# Patient Record
Sex: FEMALE | Race: WHITE | NOT HISPANIC OR LATINO | RURAL
[De-identification: names, ages, dates, MRNs, and addresses within clinical notes are randomized per-mention and may not be internally consistent; named-entity substitution may affect disease eponyms.]

---

## 2022-06-24 ENCOUNTER — CLINICAL SUPPORT (OUTPATIENT)
Dept: FAMILY MEDICINE CLINIC | Facility: CLINIC | Age: 37
End: 2022-06-24

## 2022-06-24 ENCOUNTER — TELEPHONE (OUTPATIENT)
Dept: FAMILY MEDICINE CLINIC | Facility: CLINIC | Age: 37
End: 2022-06-24

## 2022-06-24 DIAGNOSIS — I10 ESSENTIAL HYPERTENSION: Primary | ICD-10-CM

## 2022-06-24 NOTE — TELEPHONE ENCOUNTER
She came in for BP check, it was elevated at 152/98 heart rate 114, she claimed she had been to a camp all week and her bp had been running high, not on bp meds nor has she ever been on bp meds, I talked to ruba since no one has any openings.  She suggested that she come in on [facility] in clinic tomorrow or schedule an appt next week.  I explained this to the patient and apologized that with it being near closing on a Friday this was all I could offer her.  If at any point she had symptoms of chest pain, shortness or breath, blurred vision, headaches, to please to go ER.  I walked her to check out and James was working on getting her scheduled for something.

## 2022-06-27 ENCOUNTER — OFFICE VISIT (OUTPATIENT)
Dept: FAMILY MEDICINE CLINIC | Facility: CLINIC | Age: 37
End: 2022-06-27

## 2022-06-27 VITALS
HEIGHT: 65 IN | WEIGHT: 228 LBS | SYSTOLIC BLOOD PRESSURE: 140 MMHG | DIASTOLIC BLOOD PRESSURE: 86 MMHG | BODY MASS INDEX: 37.99 KG/M2

## 2022-06-27 DIAGNOSIS — J01.90 ACUTE NON-RECURRENT SINUSITIS, UNSPECIFIED LOCATION: ICD-10-CM

## 2022-06-27 DIAGNOSIS — I10 ESSENTIAL HYPERTENSION: Primary | ICD-10-CM

## 2022-06-27 PROCEDURE — 99213 OFFICE O/P EST LOW 20 MIN: CPT | Performed by: STUDENT IN AN ORGANIZED HEALTH CARE EDUCATION/TRAINING PROGRAM

## 2022-06-27 RX ORDER — LEVONORGESTREL 52 MG/1
INTRAUTERINE DEVICE INTRAUTERINE
COMMUNITY

## 2022-06-27 RX ORDER — LOSARTAN POTASSIUM 25 MG/1
25 TABLET ORAL DAILY
Qty: 30 TABLET | Refills: 1 | Status: SHIPPED | OUTPATIENT
Start: 2022-06-27 | End: 2022-07-18 | Stop reason: SDUPTHER

## 2022-06-27 RX ORDER — AMOXICILLIN 875 MG/1
875 TABLET, COATED ORAL 2 TIMES DAILY
Qty: 14 TABLET | Refills: 0 | Status: SHIPPED | OUTPATIENT
Start: 2022-06-27 | End: 2022-07-04

## 2022-06-27 NOTE — ASSESSMENT & PLAN NOTE
We will start low-dose losartan.  Patient does have Mirena, and discussed potential issues with pregnancy.  Patient does not intend on having any pregnancies in this time.    Follow-up in 2 weeks for blood pressure check and blood work.

## 2022-06-27 NOTE — ASSESSMENT & PLAN NOTE
We will treat with amoxicillin.Tylenol/Motrin for pain/fever. OTC cough and cold medication for symptoms. Nasal saline spray recommended. Cool mist humidifier at the bedside. RTC with new or worsening symptoms.

## 2022-06-27 NOTE — PROGRESS NOTES
"Chief Complaint  Blood Pressure Check    Subjective          Maryanne Reid presents to [facility]  History of Present Illness    Patient is here for evaluation of elevated blood pressure.     She was here on Friday and her blood pressure was 155/111 at Ortho. She states that she has had elevated readings when she has been having for over the past year. She has headaches when her blood pressure has been high. No Chest pain, SOA ect     She has no history of high blood pressures.     She thinks that the cuff that showed it was in the 180s/100s.     She has also had increased sinus drainage, congestion, and fullness in her ears over the past 7 days.  She states that she has been outdoors a lot and has not been able to control her symptoms with over-the-counter medications.  Denies fevers or known exposure to sick contacts.    Objective   Vital Signs:   /86 (BP Location: Left arm, Patient Position: Sitting, Cuff Size: Large Adult)   Ht 165.1 cm (65\")   Wt 103 kg (228 lb)   BMI 37.94 kg/m²     Body mass index is 37.94 kg/m².    Review of Systems    Past History:  Medical History: has no past medical history on file.   Surgical History: has a past surgical history that includes orthopedic surgery; Left oophorectomy; and Cholecystectomy.   Family History: family history is not on file.   Social History: reports that she has never smoked. She has never used smokeless tobacco. She reports previous alcohol use. She reports that she does not use drugs.      Current Outpatient Medications:   •  amoxicillin (AMOXIL) 875 MG tablet, Take 1 tablet by mouth 2 (Two) Times a Day for 7 days., Disp: 14 tablet, Rfl: 0  •  Levonorgestrel (Mirena, 52 MG,) 20 MCG/DAY intrauterine device IUD, , Disp: , Rfl:   •  losartan (Cozaar) 25 MG tablet, Take 1 tablet by mouth Daily., Disp: 30 tablet, Rfl: 1    Allergies: Patient has no known allergies.    Physical Exam  Constitutional:       General: She is not " in acute distress.     Appearance: Normal appearance. She is not ill-appearing or toxic-appearing.   HENT:      Head: Normocephalic and atraumatic.      Ears:      Comments: Clear effusion.      Nose:      Comments: Congestion, Erythema in POP  Cardiovascular:      Rate and Rhythm: Normal rate and regular rhythm.      Heart sounds: No murmur heard.    No gallop.   Pulmonary:      Effort: Pulmonary effort is normal.      Breath sounds: Normal breath sounds.   Abdominal:      General: Abdomen is flat.      Palpations: Abdomen is soft.      Tenderness: There is no abdominal tenderness. There is no guarding.   Musculoskeletal:      Right lower leg: No edema.      Left lower leg: No edema.   Neurological:      General: No focal deficit present.      Mental Status: She is alert and oriented to person, place, and time.   Psychiatric:         Mood and Affect: Mood normal.         Thought Content: Thought content normal.          Result Review :                   Assessment and Plan    Diagnoses and all orders for this visit:    1. Essential hypertension (Primary)  Assessment & Plan:  We will start low-dose losartan.  Patient does have Mirena, and discussed potential issues with pregnancy.  Patient does not intend on having any pregnancies in this time.    Follow-up in 2 weeks for blood pressure check and blood work.      2. Acute non-recurrent sinusitis, unspecified location  Assessment & Plan:  We will treat with amoxicillin.Tylenol/Motrin for pain/fever. OTC cough and cold medication for symptoms. Nasal saline spray recommended. Cool mist humidifier at the bedside. RTC with new or worsening symptoms.        Other orders  -     losartan (Cozaar) 25 MG tablet; Take 1 tablet by mouth Daily.  Dispense: 30 tablet; Refill: 1  -     amoxicillin (AMOXIL) 875 MG tablet; Take 1 tablet by mouth 2 (Two) Times a Day for 7 days.  Dispense: 14 tablet; Refill: 0      Follow Up   Return in about 2 weeks (around 7/11/2022).  Patient was  given instructions and counseling regarding her condition or for health maintenance advice. Please see specific information pulled into the AVS if appropriate.     Celsa Painter, DO

## 2022-07-11 DIAGNOSIS — Z13.220 LIPID SCREENING: ICD-10-CM

## 2022-07-11 DIAGNOSIS — I10 ESSENTIAL HYPERTENSION: Primary | ICD-10-CM

## 2022-07-12 ENCOUNTER — LAB (OUTPATIENT)
Dept: FAMILY MEDICINE CLINIC | Facility: CLINIC | Age: 37
End: 2022-07-12

## 2022-07-12 DIAGNOSIS — Z13.220 LIPID SCREENING: ICD-10-CM

## 2022-07-12 DIAGNOSIS — I10 ESSENTIAL HYPERTENSION: ICD-10-CM

## 2022-07-12 PROCEDURE — 36415 COLL VENOUS BLD VENIPUNCTURE: CPT | Performed by: STUDENT IN AN ORGANIZED HEALTH CARE EDUCATION/TRAINING PROGRAM

## 2022-07-13 LAB
ALBUMIN SERPL-MCNC: 4.5 G/DL (ref 3.8–4.8)
ALBUMIN/GLOB SERPL: 1.4 {RATIO} (ref 1.2–2.2)
ALP SERPL-CCNC: 64 IU/L (ref 44–121)
ALT SERPL-CCNC: 16 IU/L (ref 0–32)
AST SERPL-CCNC: 16 IU/L (ref 0–40)
BASOPHILS # BLD AUTO: 0 X10E3/UL (ref 0–0.2)
BASOPHILS NFR BLD AUTO: 1 %
BILIRUB SERPL-MCNC: 0.2 MG/DL (ref 0–1.2)
BUN SERPL-MCNC: 8 MG/DL (ref 6–20)
BUN/CREAT SERPL: 10 (ref 9–23)
CALCIUM SERPL-MCNC: 9.6 MG/DL (ref 8.7–10.2)
CHLORIDE SERPL-SCNC: 102 MMOL/L (ref 96–106)
CHOLEST SERPL-MCNC: 160 MG/DL (ref 100–199)
CO2 SERPL-SCNC: 24 MMOL/L (ref 20–29)
CREAT SERPL-MCNC: 0.81 MG/DL (ref 0.57–1)
EGFRCR SERPLBLD CKD-EPI 2021: 97 ML/MIN/1.73
EOSINOPHIL # BLD AUTO: 0.2 X10E3/UL (ref 0–0.4)
EOSINOPHIL NFR BLD AUTO: 3 %
ERYTHROCYTE [DISTWIDTH] IN BLOOD BY AUTOMATED COUNT: 13 % (ref 11.7–15.4)
GLOBULIN SER CALC-MCNC: 3.2 G/DL (ref 1.5–4.5)
GLUCOSE SERPL-MCNC: 90 MG/DL (ref 65–99)
HCT VFR BLD AUTO: 42.8 % (ref 34–46.6)
HDLC SERPL-MCNC: 38 MG/DL
HGB BLD-MCNC: 14.1 G/DL (ref 11.1–15.9)
IMM GRANULOCYTES # BLD AUTO: 0 X10E3/UL (ref 0–0.1)
IMM GRANULOCYTES NFR BLD AUTO: 0 %
LDLC SERPL CALC-MCNC: 89 MG/DL (ref 0–99)
LYMPHOCYTES # BLD AUTO: 2.1 X10E3/UL (ref 0.7–3.1)
LYMPHOCYTES NFR BLD AUTO: 35 %
MCH RBC QN AUTO: 28 PG (ref 26.6–33)
MCHC RBC AUTO-ENTMCNC: 32.9 G/DL (ref 31.5–35.7)
MCV RBC AUTO: 85 FL (ref 79–97)
MONOCYTES # BLD AUTO: 0.4 X10E3/UL (ref 0.1–0.9)
MONOCYTES NFR BLD AUTO: 7 %
NEUTROPHILS # BLD AUTO: 3.3 X10E3/UL (ref 1.4–7)
NEUTROPHILS NFR BLD AUTO: 54 %
PLATELET # BLD AUTO: 376 X10E3/UL (ref 150–450)
POTASSIUM SERPL-SCNC: 4.8 MMOL/L (ref 3.5–5.2)
PROT SERPL-MCNC: 7.7 G/DL (ref 6–8.5)
RBC # BLD AUTO: 5.04 X10E6/UL (ref 3.77–5.28)
SODIUM SERPL-SCNC: 139 MMOL/L (ref 134–144)
TRIGL SERPL-MCNC: 195 MG/DL (ref 0–149)
VLDLC SERPL CALC-MCNC: 33 MG/DL (ref 5–40)
WBC # BLD AUTO: 6 X10E3/UL (ref 3.4–10.8)

## 2022-07-18 ENCOUNTER — OFFICE VISIT (OUTPATIENT)
Dept: FAMILY MEDICINE CLINIC | Facility: CLINIC | Age: 37
End: 2022-07-18

## 2022-07-18 VITALS
DIASTOLIC BLOOD PRESSURE: 80 MMHG | HEIGHT: 65 IN | BODY MASS INDEX: 37.65 KG/M2 | SYSTOLIC BLOOD PRESSURE: 130 MMHG | WEIGHT: 226 LBS

## 2022-07-18 DIAGNOSIS — I10 ESSENTIAL HYPERTENSION: Primary | ICD-10-CM

## 2022-07-18 PROCEDURE — 99213 OFFICE O/P EST LOW 20 MIN: CPT | Performed by: STUDENT IN AN ORGANIZED HEALTH CARE EDUCATION/TRAINING PROGRAM

## 2022-07-18 RX ORDER — LOSARTAN POTASSIUM 50 MG/1
50 TABLET ORAL DAILY
Qty: 30 TABLET | Refills: 1 | Status: SHIPPED | OUTPATIENT
Start: 2022-07-18 | End: 2022-09-19 | Stop reason: SDUPTHER

## 2022-07-18 NOTE — ASSESSMENT & PLAN NOTE
Improved, but not at goal.  Will increase medication from 25 to 50 mg and see back in 3 months.  Continue to monitor blood pressure as an outpatient and call with any new or worsening symptoms.

## 2022-07-18 NOTE — PROGRESS NOTES
"Chief Complaint  Hypertension (Just started blood pressure meds)    Subjective          Maryanne Reid presents to [facility]  History of Present Illness    She is tolerating her medications well at this time. She has been checking her blood pressure and it is running in the 130s/90s.  He denies any headaches, chest pain, shortness of breath.  She is tolerating her medication without any side effects.  She is here for follow-up and discussion of previous blood work.        Objective   Vital Signs:   /80 (BP Location: Left arm, Patient Position: Sitting, Cuff Size: Large Adult)   Ht 165.1 cm (65\")   Wt 103 kg (226 lb)   BMI 37.61 kg/m²     Body mass index is 37.61 kg/m².    Review of Systems    Past History:  Medical History: has no past medical history on file.   Surgical History: has a past surgical history that includes orthopedic surgery; Left oophorectomy; and Cholecystectomy.   Family History: family history is not on file.   Social History: reports that she has never smoked. She has never used smokeless tobacco. She reports previous alcohol use. She reports that she does not use drugs.      Current Outpatient Medications:   •  losartan (Cozaar) 50 MG tablet, Take 1 tablet by mouth Daily., Disp: 30 tablet, Rfl: 1  •  Levonorgestrel (Mirena, 52 MG,) 20 MCG/DAY intrauterine device IUD, , Disp: , Rfl:     Allergies: Patient has no known allergies.    Physical Exam  Constitutional:       General: She is not in acute distress.     Appearance: She is not ill-appearing or toxic-appearing.   HENT:      Head: Normocephalic and atraumatic.   Cardiovascular:      Rate and Rhythm: Normal rate and regular rhythm.      Heart sounds: No murmur heard.  Pulmonary:      Effort: Pulmonary effort is normal. No respiratory distress.   Abdominal:      General: There is no distension.      Palpations: Abdomen is soft.      Tenderness: There is no abdominal tenderness.   Musculoskeletal:      " Right lower leg: No edema.      Left lower leg: No edema.   Neurological:      General: No focal deficit present.      Mental Status: She is alert and oriented to person, place, and time.   Psychiatric:         Mood and Affect: Mood normal.         Thought Content: Thought content normal.          Result Review :                   Assessment and Plan    Diagnoses and all orders for this visit:    1. Essential hypertension (Primary)  Assessment & Plan:  Improved, but not at goal.  Will increase medication from 25 to 50 mg and see back in 3 months.  Continue to monitor blood pressure as an outpatient and call with any new or worsening symptoms.      Other orders  -     losartan (Cozaar) 50 MG tablet; Take 1 tablet by mouth Daily.  Dispense: 30 tablet; Refill: 1      Follow Up   Return in about 3 months (around 10/18/2022) for Recheck.  Patient was given instructions and counseling regarding her condition or for health maintenance advice. Please see specific information pulled into the AVS if appropriate.     Celsa Painter, DO

## 2022-07-22 RX ORDER — LOSARTAN POTASSIUM 25 MG/1
25 TABLET ORAL DAILY
Qty: 90 TABLET | Refills: 0 | Status: SHIPPED | OUTPATIENT
Start: 2022-07-22 | End: 2022-09-21 | Stop reason: DRUGHIGH

## 2022-08-25 RX ORDER — LOSARTAN POTASSIUM 50 MG/1
50 TABLET ORAL DAILY
Qty: 30 TABLET | Refills: 1 | OUTPATIENT
Start: 2022-08-25

## 2022-09-19 NOTE — TELEPHONE ENCOUNTER
Caller: Maryanne Reid    Relationship: Self    Best call back number: [phone]    Requested Prescriptions:   Requested Prescriptions     Pending Prescriptions Disp Refills   • losartan (Cozaar) 50 MG tablet 30 tablet 1     Sig: Take 1 tablet by mouth Daily.        Pharm[facility] where request should be sent: [id number] [address] AT [facility] & [address] [facility] [phone] [facility] [phone] FX     Additional details provided by patient: PATIENT DOES NOT HAVE ANYMORE REFILLS, APPOINTMENT IS SET UP FOR 10.17. PATIENT NEEDS TEMPORARY REFILL UNTIL THEN.    Does the patient have less than a 3 day supply:  [x] Yes  [] No    Rona Valiente Rep   09/19/22 15:33 EDT

## 2022-09-21 RX ORDER — LOSARTAN POTASSIUM 50 MG/1
50 TABLET ORAL DAILY
Qty: 30 TABLET | Refills: 0 | Status: SHIPPED | OUTPATIENT
Start: 2022-09-21 | End: 2022-10-17 | Stop reason: SDUPTHER

## 2022-09-21 NOTE — TELEPHONE ENCOUNTER
Will send x 1 month until her appt since Dr Painter is out. Please check with patient to confirm which dosage she is taking- as both are listed in her chart. Thanks.

## 2022-09-22 RX ORDER — LOSARTAN POTASSIUM 50 MG/1
50 TABLET ORAL DAILY
Qty: 90 TABLET | OUTPATIENT
Start: 2022-09-22

## 2022-10-17 ENCOUNTER — OFFICE VISIT (OUTPATIENT)
Dept: FAMILY MEDICINE CLINIC | Facility: CLINIC | Age: 37
End: 2022-10-17

## 2022-10-17 VITALS
HEIGHT: 65 IN | BODY MASS INDEX: 37.32 KG/M2 | DIASTOLIC BLOOD PRESSURE: 78 MMHG | SYSTOLIC BLOOD PRESSURE: 128 MMHG | WEIGHT: 224 LBS

## 2022-10-17 DIAGNOSIS — I10 ESSENTIAL HYPERTENSION: Primary | ICD-10-CM

## 2022-10-17 DIAGNOSIS — G43.809 OTHER MIGRAINE WITHOUT STATUS MIGRAINOSUS, NOT INTRACTABLE: ICD-10-CM

## 2022-10-17 PROCEDURE — 99213 OFFICE O/P EST LOW 20 MIN: CPT | Performed by: STUDENT IN AN ORGANIZED HEALTH CARE EDUCATION/TRAINING PROGRAM

## 2022-10-17 RX ORDER — RIZATRIPTAN BENZOATE 10 MG/1
10 TABLET ORAL ONCE AS NEEDED
Qty: 20 TABLET | Refills: 0 | Status: SHIPPED | OUTPATIENT
Start: 2022-10-17 | End: 2022-12-27

## 2022-10-17 RX ORDER — PROMETHAZINE HYDROCHLORIDE 25 MG/1
25 TABLET ORAL EVERY 6 HOURS PRN
Qty: 15 TABLET | Refills: 0 | Status: SHIPPED | OUTPATIENT
Start: 2022-10-17

## 2022-10-17 RX ORDER — LOSARTAN POTASSIUM 50 MG/1
50 TABLET ORAL DAILY
Qty: 90 TABLET | Refills: 1 | Status: SHIPPED | OUTPATIENT
Start: 2022-10-17

## 2022-10-17 NOTE — PROGRESS NOTES
"Chief Complaint  Hypertension (Follow up)    Subjective          Maryanne Reid presents to [facility]  History of Present Illness    HTN: Here for follow up. She states that she is doing well with her medication. She denies any CP, SOA, HA. She does need refills at this time.     Migraines: She states that they have been going on for some time, but in the past few months she states that they have gotten worse. She states that she has been using promethazine in the past to help with the nausea for this. She has used sumatriptan in the past with significant side effects.         Objective   Vital Signs:   /78 (BP Location: Left arm, Patient Position: Sitting, Cuff Size: Large Adult)   Ht 165.1 cm (65\")   Wt 102 kg (224 lb)   BMI 37.28 kg/m²     Body mass index is 37.28 kg/m².    Review of Systems    Past History:  Medical History: has no past medical history on file.   Surgical History: has a past surgical history that includes orthopedic surgery; Left oophorectomy; and Cholecystectomy.   Family History: family history is not on file.   Social History: reports that she has never smoked. She has never used smokeless tobacco. She reports that she does not currently use alcohol. She reports that she does not use drugs.      Current Outpatient Medications:   •  losartan (Cozaar) 50 MG tablet, Take 1 tablet by mouth Daily., Disp: 90 tablet, Rfl: 1  •  Levonorgestrel (Mirena, 52 MG,) 20 MCG/DAY intrauterine device IUD, , Disp: , Rfl:   •  promethazine (PHENERGAN) 25 MG tablet, Take 1 tablet by mouth Every 6 (Six) Hours As Needed for Nausea or Vomiting., Disp: 15 tablet, Rfl: 0  •  rizatriptan (Maxalt) 10 MG tablet, Take 1 tablet by mouth 1 (One) Time As Needed for Migraine for up to 1 dose. May repeat in 2 hours if needed, Disp: 20 tablet, Rfl: 0    Allergies: Patient has no known allergies.    Physical Exam  Constitutional:       General: She is not in acute distress.     Appearance: " She is not ill-appearing or toxic-appearing.   HENT:      Head: Normocephalic and atraumatic.   Cardiovascular:      Rate and Rhythm: Normal rate and regular rhythm.      Heart sounds: No murmur heard.  Pulmonary:      Effort: Pulmonary effort is normal. No respiratory distress.   Neurological:      General: No focal deficit present.      Mental Status: She is alert and oriented to person, place, and time.   Psychiatric:         Mood and Affect: Mood normal.         Thought Content: Thought content normal.          Result Review :                   Assessment and Plan    Diagnoses and all orders for this visit:    1. Essential hypertension (Primary)    2. Other migraine without status migrainosus, not intractable    Other orders  -     losartan (Cozaar) 50 MG tablet; Take 1 tablet by mouth Daily.  Dispense: 90 tablet; Refill: 1  -     rizatriptan (Maxalt) 10 MG tablet; Take 1 tablet by mouth 1 (One) Time As Needed for Migraine for up to 1 dose. May repeat in 2 hours if needed  Dispense: 20 tablet; Refill: 0  -     promethazine (PHENERGAN) 25 MG tablet; Take 1 tablet by mouth Every 6 (Six) Hours As Needed for Nausea or Vomiting.  Dispense: 15 tablet; Refill: 0    Will refill losartan and patient advised that she should continue the meidcaiton. Call with new or worsening symptoms.     Will also send in maxalt as she has not tried this. She has not done well with sumitriptan and has not tolerated this. If she does not do well with this we will try Nurtec. Follow up in 6 months or sooner with new or worsening symptoms.       Follow Up   No follow-ups on file.  Patient was given instructions and counseling regarding her condition or for health maintenance advice. Please see specific information pulled into the AVS if appropriate.     Celsa Painter, DO   alcohol intoxication

## 2022-12-27 RX ORDER — RIZATRIPTAN BENZOATE 10 MG/1
TABLET ORAL
Qty: 20 TABLET | Refills: 2 | Status: SHIPPED | OUTPATIENT
Start: 2022-12-27

## 2023-04-24 ENCOUNTER — OFFICE VISIT (OUTPATIENT)
Dept: FAMILY MEDICINE CLINIC | Facility: CLINIC | Age: 38
End: 2023-04-24
Payer: OTHER GOVERNMENT

## 2023-04-24 VITALS
DIASTOLIC BLOOD PRESSURE: 84 MMHG | WEIGHT: 232 LBS | BODY MASS INDEX: 38.65 KG/M2 | HEIGHT: 65 IN | HEART RATE: 94 BPM | OXYGEN SATURATION: 96 % | SYSTOLIC BLOOD PRESSURE: 126 MMHG | RESPIRATION RATE: 16 BRPM

## 2023-04-24 DIAGNOSIS — G43.809 OTHER MIGRAINE WITHOUT STATUS MIGRAINOSUS, NOT INTRACTABLE: ICD-10-CM

## 2023-04-24 DIAGNOSIS — I10 ESSENTIAL HYPERTENSION: Primary | ICD-10-CM

## 2023-04-24 PROBLEM — S82.299A: Status: ACTIVE | Noted: 2021-07-07

## 2023-04-24 PROBLEM — J30.1 SEASONAL ALLERGIC RHINITIS DUE TO POLLEN: Status: ACTIVE | Noted: 2021-07-09

## 2023-04-24 PROBLEM — T14.8XXA FRACTURE: Status: ACTIVE | Noted: 2021-07-07

## 2023-04-24 PROBLEM — R51.9 HA (HEADACHE): Status: ACTIVE | Noted: 2021-07-09

## 2023-04-24 PROCEDURE — 99214 OFFICE O/P EST MOD 30 MIN: CPT | Performed by: PHYSICIAN ASSISTANT

## 2023-04-24 RX ORDER — LOSARTAN POTASSIUM 50 MG/1
50 TABLET ORAL DAILY
Qty: 90 TABLET | Refills: 1 | Status: SHIPPED | OUTPATIENT
Start: 2023-04-24

## 2023-04-24 NOTE — PROGRESS NOTES
"Chief Complaint  Hypertension (Medication refill on all medications )    Subjective          Maryanne Reid presents to [facility]  History of Present Illness  Patient in today for medication recheck and refills on losartan. States blood pressure has been doing well. She had fasting labs last summer and would like to wait until next appt. Denies any chest pain or shortness of breath.  She states Is utd on gyn exam. She has migraines and takes maxalt prn and has also tried nurtec therapy. Needs refill on promethazine that she states she keeps on hand prn for migraines. Denies side effects of medication.  States continues to have headaches- have gotten some better since seeing chiropractor but still having them frequently.  Has not had eye exam in several years. Denies any visual changes.   Hypertension  This is a chronic problem. Pertinent negatives include no chest pain, headaches, palpitations, peripheral edema or shortness of breath. Current antihypertension treatment includes angiotensin blockers.       Objective   Vital Signs:   /84 (BP Location: Left arm, Patient Position: Sitting, Cuff Size: Adult)   Pulse 94   Resp 16   Ht 165.1 cm (65\")   Wt 105 kg (232 lb)   SpO2 96%   BMI 38.61 kg/m²     Body mass index is 38.61 kg/m².    Review of Systems   Constitutional: Negative for fever.   HENT: Negative for congestion and sore throat.    Respiratory: Negative for shortness of breath.    Cardiovascular: Negative for chest pain and palpitations.   Gastrointestinal: Negative for abdominal pain, diarrhea, nausea and vomiting.   Neurological: Positive for headache. Negative for dizziness.   Psychiatric/Behavioral: Negative for depressed mood.       Past History:  Medical History: has no past medical history on file.   Surgical History: has a past surgical history that includes orthopedic surgery; Left oophorectomy; and Cholecystectomy.   Family History: family history is not on " file.   Social History: reports that she has never smoked. She has never been exposed to tobacco smoke. She has never used smokeless tobacco. She reports that she does not currently use alcohol after a past usage of about 1.0 standard drink per week. She reports that she does not use drugs.      Current Outpatient Medications:   •  Levonorgestrel (Mirena, 52 MG,) 20 MCG/DAY intrauterine device IUD, , Disp: , Rfl:   •  losartan (COZAAR) 50 MG tablet, TAKE 1 TABLET BY MOUTH DAILY, Disp: 90 tablet, Rfl: 1  •  promethazine (PHENERGAN) 25 MG tablet, Take 1 tablet by mouth Every 6 (Six) Hours As Needed for Nausea or Vomiting., Disp: 15 tablet, Rfl: 0  •  Rimegepant Sulfate (NURTEC) 75 MG tablet dispersible tablet, Take  by mouth At Night As Needed., Disp: , Rfl:   •  rizatriptan (MAXALT) 10 MG tablet, TAKE 1 TABLET BY MOUTH 1 TIME FOR UP TO 1 DOSE AS NEEDED FOR MIGRAINE. MAY REPEAT IN 2 HOURS AS NEEDED, Disp: 20 tablet, Rfl: 2  Allergies: Patient has no known allergies.    Physical Exam  Constitutional:       Appearance: Normal appearance.   HENT:      Right Ear: Tympanic membrane normal.      Left Ear: Tympanic membrane normal.      Mouth/Throat:      Pharynx: Oropharynx is clear.   Eyes:      Conjunctiva/sclera: Conjunctivae normal.      Pupils: Pupils are equal, round, and reactive to light.   Cardiovascular:      Rate and Rhythm: Normal rate and regular rhythm.      Heart sounds: Normal heart sounds.   Pulmonary:      Effort: Pulmonary effort is normal.      Breath sounds: Normal breath sounds.   Abdominal:      Palpations: Abdomen is soft.      Tenderness: There is no abdominal tenderness.   Neurological:      Mental Status: She is alert and oriented to person, place, and time.      Cranial Nerves: Cranial nerves 2-12 are intact.      Coordination: Coordination is intact.      Gait: Gait is intact.   Psychiatric:         Mood and Affect: Mood normal.         Behavior: Behavior normal.             Assessment and Plan    Diagnoses and all orders for this visit:    1. Essential hypertension (Primary)  Refills were sent earlier today. Encouraged healthy diet and exercise and can check fasting labs on next visit. RTC prior to recheck as needed.   2. Other migraine without status migrainosus, not intractable  -     Ambulatory Referral to Neurology  Will put in referral for neurology with persistent headaches. Refilled promethazine to use prn-denies side effects of medication. RTC or to ER prior for any worsening symptoms if needed.           Follow Up   No follow-ups on file.  Patient was given instructions and counseling regarding her condition or for health maintenance advice. Please see specific information pulled into the AVS if appropriate.     Ann Marie Singletary PA-C

## 2023-06-07 DIAGNOSIS — I10 ESSENTIAL HYPERTENSION: Primary | ICD-10-CM

## 2023-06-08 ENCOUNTER — LAB (OUTPATIENT)
Dept: FAMILY MEDICINE CLINIC | Facility: CLINIC | Age: 38
End: 2023-06-08
Payer: OTHER GOVERNMENT

## 2023-06-09 LAB
ALBUMIN SERPL-MCNC: 4.5 G/DL (ref 3.8–4.8)
ALBUMIN/GLOB SERPL: 1.4 {RATIO} (ref 1.2–2.2)
ALP SERPL-CCNC: 61 IU/L (ref 44–121)
ALT SERPL-CCNC: 12 IU/L (ref 0–32)
AST SERPL-CCNC: 14 IU/L (ref 0–40)
BASOPHILS # BLD AUTO: 0.1 X10E3/UL (ref 0–0.2)
BASOPHILS NFR BLD AUTO: 1 %
BILIRUB SERPL-MCNC: 0.3 MG/DL (ref 0–1.2)
BUN SERPL-MCNC: 13 MG/DL (ref 6–20)
BUN/CREAT SERPL: 17 (ref 9–23)
CALCIUM SERPL-MCNC: 9.2 MG/DL (ref 8.7–10.2)
CHLORIDE SERPL-SCNC: 100 MMOL/L (ref 96–106)
CHOLEST SERPL-MCNC: 172 MG/DL (ref 100–199)
CO2 SERPL-SCNC: 20 MMOL/L (ref 20–29)
CREAT SERPL-MCNC: 0.76 MG/DL (ref 0.57–1)
EGFRCR SERPLBLD CKD-EPI 2021: 104 ML/MIN/1.73
EOSINOPHIL # BLD AUTO: 0 X10E3/UL (ref 0–0.4)
EOSINOPHIL NFR BLD AUTO: 0 %
ERYTHROCYTE [DISTWIDTH] IN BLOOD BY AUTOMATED COUNT: 12.7 % (ref 11.7–15.4)
GLOBULIN SER CALC-MCNC: 3.2 G/DL (ref 1.5–4.5)
GLUCOSE SERPL-MCNC: 92 MG/DL (ref 70–99)
HCT VFR BLD AUTO: 40.9 % (ref 34–46.6)
HDLC SERPL-MCNC: 40 MG/DL
HGB BLD-MCNC: 13.7 G/DL (ref 11.1–15.9)
IMM GRANULOCYTES # BLD AUTO: 0 X10E3/UL (ref 0–0.1)
IMM GRANULOCYTES NFR BLD AUTO: 0 %
LDLC SERPL CALC-MCNC: 106 MG/DL (ref 0–99)
LYMPHOCYTES # BLD AUTO: 2 X10E3/UL (ref 0.7–3.1)
LYMPHOCYTES NFR BLD AUTO: 27 %
MCH RBC QN AUTO: 29.1 PG (ref 26.6–33)
MCHC RBC AUTO-ENTMCNC: 33.5 G/DL (ref 31.5–35.7)
MCV RBC AUTO: 87 FL (ref 79–97)
MONOCYTES # BLD AUTO: 0.4 X10E3/UL (ref 0.1–0.9)
MONOCYTES NFR BLD AUTO: 6 %
NEUTROPHILS # BLD AUTO: 4.9 X10E3/UL (ref 1.4–7)
NEUTROPHILS NFR BLD AUTO: 66 %
PLATELET # BLD AUTO: 346 X10E3/UL (ref 150–450)
POTASSIUM SERPL-SCNC: 4.4 MMOL/L (ref 3.5–5.2)
PROT SERPL-MCNC: 7.7 G/DL (ref 6–8.5)
RBC # BLD AUTO: 4.7 X10E6/UL (ref 3.77–5.28)
SODIUM SERPL-SCNC: 136 MMOL/L (ref 134–144)
TRIGL SERPL-MCNC: 147 MG/DL (ref 0–149)
TSH SERPL DL<=0.005 MIU/L-ACNC: 0.84 UIU/ML (ref 0.45–4.5)
VLDLC SERPL CALC-MCNC: 26 MG/DL (ref 5–40)
WBC # BLD AUTO: 7.4 X10E3/UL (ref 3.4–10.8)

## 2023-09-20 ENCOUNTER — OFFICE VISIT (OUTPATIENT)
Dept: NEUROLOGY | Facility: CLINIC | Age: 38
End: 2023-09-20
Payer: OTHER GOVERNMENT

## 2023-09-20 VITALS
SYSTOLIC BLOOD PRESSURE: 122 MMHG | HEART RATE: 115 BPM | DIASTOLIC BLOOD PRESSURE: 82 MMHG | BODY MASS INDEX: 38.65 KG/M2 | WEIGHT: 232 LBS | OXYGEN SATURATION: 99 % | HEIGHT: 65 IN

## 2023-09-20 DIAGNOSIS — G43.009 MIGRAINE WITHOUT AURA AND WITHOUT STATUS MIGRAINOSUS, NOT INTRACTABLE: Primary | ICD-10-CM

## 2023-09-20 DIAGNOSIS — R06.83 SNORING: ICD-10-CM

## 2023-09-20 RX ORDER — MULTIVIT WITH MINERALS/LUTEIN
1000 TABLET ORAL DAILY
COMMUNITY

## 2023-09-20 RX ORDER — PYRIDOXINE HCL (VITAMIN B6) 100 MG
TABLET ORAL
COMMUNITY

## 2023-09-20 RX ORDER — NORTRIPTYLINE HYDROCHLORIDE 10 MG/1
10 CAPSULE ORAL NIGHTLY
Qty: 30 CAPSULE | Refills: 2 | Status: SHIPPED | OUTPATIENT
Start: 2023-09-20 | End: 2024-09-19

## 2023-09-20 RX ORDER — MULTIPLE VITAMINS W/ MINERALS TAB 9MG-400MCG
1 TAB ORAL DAILY
COMMUNITY

## 2023-09-20 NOTE — PROGRESS NOTES
"   Neuro Office Visit      Encounter Date: 2023   Patient Name: Maryanne Reid  : 1985   MRN: [id number]   PCP:  Ann Marie Singletary PA-C     Chief Complaint:    Chief Complaint   Patient presents with    Migraine       History of Present Illness: Maryanne Reid is a 38 y.o. female who is here today in Neurology for  migraines.    PMH of allergic rhinitis, HTN, and headaches.     Patient was seen by PCP on 2023 and at that visit reported migraines for which she takes Maxalt as needed, has also tried Nurtec.  Requested refill of promethazine if she keeps it on hand for migraines.  Reported that she continued to have headaches but they have gotten better since seeing her chiropractor but still having them frequently.      Migraines have been present for 20+ years, they have become more frequent and intense, usually located on forehead, + light/ sound sensitivity, + nausea, notes during migraine her vision is \"fuzzy\", some weeks she can have up to 3 migraine days, triggers include menstrual cycle, stress, BP, sinus congestion, dehydration, barometric pressure changes. She denies visual aura.     FH: Mom and brother with MATHEUS; no family history of migraines   She did recently have an eye appointment - She does have astigmatism but reports that otherwise her visit was normal    Abortive: Rizatriptan - doesn't do much, Sumatriptan caused SE of claustrophobia. Nurtec wasn't previously covered by insurance - has been given samples which were helpful, Promethazine  Preventive: Nothing     Sleep is okay, she is a light sleeper and often wakes up at night  + Snoring   Feels that her balance is chronically poor      Subjective      Review of Systems   Constitutional: Negative.    Eyes:  Positive for photophobia.        With headaches   Respiratory: Negative.     Cardiovascular: Negative.    Gastrointestinal:  Positive for nausea.   Endocrine: Negative.    Genitourinary: Negative.    Musculoskeletal: Negative.  "   Skin: Negative.    Allergic/Immunologic: Negative.    Neurological:  Positive for headaches.   Hematological: Negative.    Psychiatric/Behavioral:  Positive for sleep disturbance.         Past Medical History:   Past Medical History:   Diagnosis Date    Cluster headache 2000    Since i was a child    Headache, tension-type 2010    Most of my adult life    Hypertension 2022    Migraine 2010    Most of my adult life       Past Surgical History:   Past Surgical History:   Procedure Laterality Date    CHOLECYSTECTOMY      LEFT OOPHORECTOMY      ORTHOPEDIC SURGERY         Family History: No family history on file.    Social History:   Social History     Socioeconomic History    Marital status:    Tobacco Use    Smoking status: Never     Passive exposure: Never    Smokeless tobacco: Never   Vaping Use    Vaping Use: Never used   Substance and Sexual Activity    Alcohol use: Never     Alcohol/week: 1.0 standard drink     Types: 1 Standard drinks or equivalent per week    Drug use: Never    Sexual activity: Yes     Partners: Male     Birth control/protection: I.U.D.     Comment: only have one ovary and tube because of large cyst.       Medications:     Current Outpatient Medications:     Amino Acids (COMPLEX ESSENTIAL MSD PO), Take  by mouth. 6 tablets daily, Disp: , Rfl:     Cholecalciferol (Vitamin D-3) 125 MCG (5000 UT) tablet, Take  by mouth., Disp: , Rfl:     Cranberry 500 MG capsule, Take  by mouth., Disp: , Rfl:     Levonorgestrel (Mirena, 52 MG,) 20 MCG/DAY intrauterine device IUD, , Disp: , Rfl:     losartan (COZAAR) 50 MG tablet, TAKE 1 TABLET BY MOUTH DAILY, Disp: 90 tablet, Rfl: 1    multivitamin with minerals (ONE-A-DAY WOMENS PO), Take 1 tablet by mouth Daily., Disp: , Rfl:     NON FORMULARY, Chromium complex 6 tablets daily for carbohydrate digestion, Disp: , Rfl:     promethazine (PHENERGAN) 25 MG tablet, Take 1 tablet by mouth Every 6 (Six) Hours As Needed for Nausea or Vomiting., Disp: 15 tablet,  "Rfl: 0    Rimegepant Sulfate (NURTEC) 75 MG tablet dispersible tablet, Take 1 tablet by mouth Every Other Day As Needed (migraine)., Disp: 2 tablet, Rfl: 0    VITAMIN E 1000 UNIT capsule, Take 1 capsule by mouth Daily., Disp: , Rfl:     nortriptyline (Pamelor) 10 MG capsule, Take 1 capsule by mouth Every Night., Disp: 30 capsule, Rfl: 2    Allergies:   No Known Allergies      Objective     Objective:    /82   Pulse 115   Ht 165.1 cm (65\")   Wt 105 kg (232 lb)   SpO2 99%   BMI 38.61 kg/m²   Body mass index is 38.61 kg/m².    Physical Exam  Vitals reviewed.   Constitutional:       Appearance: Normal appearance.   HENT:      Head: Normocephalic and atraumatic.      Mouth/Throat:      Mouth: Mucous membranes are moist.      Pharynx: Oropharynx is clear.   Pulmonary:      Effort: Pulmonary effort is normal. No respiratory distress.   Musculoskeletal:      Right lower leg: No edema.      Left lower leg: No edema.   Skin:     General: Skin is warm and dry.   Neurological:      Mental Status: She is alert.        Neurology Exam:    General apperance: NAD.     Mental status: Alert, awake and oriented to time place and person.    Fund of knowledge:  Normal.     Language and Speech: No aphasia or dysarthria.    Naming , Repetition and Comprehension:  Can name objects, repeat a sentence and follow commands. Speech is clear and fluent with good repetition, comprehension, and naming.    Cranial Nerves:   CN II: Visual fields are full. Intact.  Pupils - PERRLA  CN III, IV and VI: Extraocular movements are intact. Normal saccades.   CN V: Facial sensation is intact.   CN VII: Muscles of facial expression reveal no asymmetry. Intact.   CN VIII: Hearing is intact.  CN IX and X: Palate elevates symmetrically. Intact  CN XI: Shoulder shrug is intact.   CN XII: Tongue is midline without evidence of atrophy or fasciculation.     Motor:  Right UE muscle strength 5/5. Normal tone.     Left UE muscle strength 5/5. Normal tone.    "   Right LE muscle strength 5/5. Normal tone.     Left LE muscle strength 5/5. Normal tone.      Sensory: Normal light touch and vibration sensation bilaterally.    DTRs: 2+ bilaterally in upper and lower extremities.    Coordination: Normal finger-to-nose, heel to shin B/L.    Rhomberg: Negative.    Gait: Normal.    Results:   Imaging:   No Images in the past 120 days found..     Labs:   Lab Results   Component Value Date    GLUCOSE 92 06/08/2023    BUN 13 06/08/2023    CREATININE 0.76 06/08/2023    EGFRRESULT 104 06/08/2023    BCR 17 06/08/2023    K 4.4 06/08/2023    CO2 20 06/08/2023    CALCIUM 9.2 06/08/2023    PROTENTOTREF 7.7 06/08/2023    ALBUMIN 4.5 06/08/2023    BILITOT 0.3 06/08/2023    AST 14 06/08/2023    ALT 12 06/08/2023     WBC   Date Value Ref Range Status   06/08/2023 7.4 3.4 - 10.8 x10E3/uL Final   07/10/2021 11.48 (H) 3.70 - 10.30 10*3/uL Final     RBC   Date Value Ref Range Status   06/08/2023 4.70 3.77 - 5.28 x10E6/uL Final   07/10/2021 4.24 3.90 - 5.20 10*6/uL Final     Hemoglobin   Date Value Ref Range Status   06/08/2023 13.7 11.1 - 15.9 g/dL Final   07/10/2021 12.4 11.2 - 15.7 g/dL Final     Hematocrit   Date Value Ref Range Status   06/08/2023 40.9 34.0 - 46.6 % Final   07/10/2021 38.4 34.0 - 45.0 % Final     MCV   Date Value Ref Range Status   06/08/2023 87 79 - 97 fL Final   07/10/2021 91 79 - 98 fL Final     MCH   Date Value Ref Range Status   06/08/2023 29.1 26.6 - 33.0 pg Final   07/10/2021 29.2 26.0 - 32.0 pg Final     MCHC   Date Value Ref Range Status   06/08/2023 33.5 31.5 - 35.7 g/dL Final   07/10/2021 32.3 30.7 - 35.5 g/dL Final     RDW   Date Value Ref Range Status   06/08/2023 12.7 11.7 - 15.4 % Final   07/10/2021 13.8 11.5 - 14.5 % Final     MPV   Date Value Ref Range Status   07/10/2021 9.4 8.8 - 12.5 fL Final     Platelets   Date Value Ref Range Status   06/08/2023 346 150 - 450 x10E3/uL Final   07/10/2021 316 155 - 369 10*3/uL Final     Neutrophil Rel %   Date Value Ref  Range Status   06/08/2023 66 Not Estab. % Final     Lymphocyte Rel %   Date Value Ref Range Status   06/08/2023 27 Not Estab. % Final     Monocyte Rel %   Date Value Ref Range Status   06/08/2023 6 Not Estab. % Final     Eosinophil Rel %   Date Value Ref Range Status   06/08/2023 0 Not Estab. % Final     Basophil Rel %   Date Value Ref Range Status   06/08/2023 1 Not Estab. % Final     Neutrophils Absolute   Date Value Ref Range Status   06/08/2023 4.9 1.4 - 7.0 x10E3/uL Final     Lymphocytes Absolute   Date Value Ref Range Status   06/08/2023 2.0 0.7 - 3.1 x10E3/uL Final     Monocytes Absolute   Date Value Ref Range Status   06/08/2023 0.4 0.1 - 0.9 x10E3/uL Final     Eosinophils Absolute   Date Value Ref Range Status   06/08/2023 0.0 0.0 - 0.4 x10E3/uL Final     Basophils Absolute   Date Value Ref Range Status   06/08/2023 0.1 0.0 - 0.2 x10E3/uL Final     nRBC   Date Value Ref Range Status   07/10/2021 0.0 <=0.0 per 100 WBCs Final         Assessment / Plan      Assessment/Plan:   Diagnoses and all orders for this visit:    1. Migraine without aura and without status migrainosus, not intractable (Primary)  -     Rimegepant Sulfate (NURTEC) 75 MG tablet dispersible tablet; Take 1 tablet by mouth Every Other Day As Needed (migraine).  Dispense: 2 tablet; Refill: 0  -     Ambulatory Referral to Sleep Medicine  -     nortriptyline (Pamelor) 10 MG capsule; Take 1 capsule by mouth Every Night.  Dispense: 30 capsule; Refill: 2  -     MRI Brain Without Contrast; Future    2. Snoring  -     Ambulatory Referral to Sleep Medicine       Maryanne Reid is in neurology clinic to establish care for migraines. She has not taken anything for preventive treatment, she does report poor sleep, we will start Nortriptyline 10 mg nightly to aid with migraine prevention and with sleep. Regarding preventive treatment she has tried Rizatriptan and Sumatriptan without benefit, I have given her Nurtec sample today and have also reached out to  our pharmacy team who will start the PA process for [facility]. As her headaches have been increasing both in frequency and severity over the last few years we will pursue MRI brain. She also reports snoring so I have referred her to sleep medicine for concern that she may have MATHEUS that is worsening her migraines. Will plan to see Maryanne back in clinic in about 6-8 weeks to discuss additional treatment options.     Patient Education:       Reviewed medications, potential side effects and signs and symptoms to report. Discussed risk versus benefits of treatment plan with patient and/or family-including medications, labs and radiology that may be ordered. Addressed questions and concerns during visit. Patient and/or family verbalized understanding and agree with plan. Instructed to call the office with any questions and report to ER with any life-threatening symptoms.     Follow Up:   Return in about 8 weeks (around 11/15/2023).    I spent 30  minutes face to face with the patient and family. I personally spent 50 percent of this time counseling and discussing diagnosis, diagnostic testing, evaluation, treatment options, and management .       During this visit the following were done:  Labs Reviewed [x]    Labs Ordered []    Radiology Reports Reviewed []    Radiology Ordered [x]    PCP Records Reviewed [x]    Referring Provider Records Reviewed []    ER Records Reviewed []    Hospital Records Reviewed []    History Obtained From Family []    Radiology Images Reviewed []    Other Reviewed []    Records Requested []      SHANTANU Cohen  [facility] NEUROLOGY  [address] [facility] [address]  [facility] [id number] [phone]

## 2023-09-21 ENCOUNTER — SPECIALTY PHARMACY (OUTPATIENT)
Dept: NEUROLOGY | Facility: CLINIC | Age: 38
End: 2023-09-21
Payer: OTHER GOVERNMENT

## 2023-09-21 DIAGNOSIS — G43.009 MIGRAINE WITHOUT AURA AND WITHOUT STATUS MIGRAINOSUS, NOT INTRACTABLE: ICD-10-CM

## 2023-10-24 RX ORDER — LOSARTAN POTASSIUM 50 MG/1
50 TABLET ORAL DAILY
Qty: 30 TABLET | Refills: 0 | Status: SHIPPED | OUTPATIENT
Start: 2023-10-24 | End: 2023-10-26 | Stop reason: SDUPTHER

## 2023-10-24 RX ORDER — LOSARTAN POTASSIUM 50 MG/1
50 TABLET ORAL DAILY
Qty: 90 TABLET | OUTPATIENT
Start: 2023-10-24

## 2023-10-24 NOTE — TELEPHONE ENCOUNTER
Looks like I am the only one that has seen her in the past year. She is due for a physical. If she wants to continue to see Isa she will need to be scheduled with her, if she wants to transition to me we can do that as well. I will send 1 month of medication. Thanks.

## 2023-10-26 ENCOUNTER — OFFICE VISIT (OUTPATIENT)
Dept: FAMILY MEDICINE CLINIC | Facility: CLINIC | Age: 38
End: 2023-10-26
Payer: OTHER GOVERNMENT

## 2023-10-26 VITALS
DIASTOLIC BLOOD PRESSURE: 80 MMHG | HEART RATE: 101 BPM | OXYGEN SATURATION: 98 % | BODY MASS INDEX: 38.82 KG/M2 | HEIGHT: 65 IN | SYSTOLIC BLOOD PRESSURE: 132 MMHG | WEIGHT: 233 LBS

## 2023-10-26 DIAGNOSIS — I10 HYPERTENSION, ESSENTIAL: ICD-10-CM

## 2023-10-26 DIAGNOSIS — Z00.00 ROUTINE PHYSICAL EXAMINATION: Primary | ICD-10-CM

## 2023-10-26 DIAGNOSIS — J06.9 UPPER RESPIRATORY TRACT INFECTION, UNSPECIFIED TYPE: ICD-10-CM

## 2023-10-26 PROCEDURE — 99395 PREV VISIT EST AGE 18-39: CPT | Performed by: PHYSICIAN ASSISTANT

## 2023-10-26 RX ORDER — LOSARTAN POTASSIUM 50 MG/1
50 TABLET ORAL DAILY
Qty: 90 TABLET | Refills: 1 | Status: SHIPPED | OUTPATIENT
Start: 2023-10-26

## 2023-10-26 NOTE — PROGRESS NOTES
"Chief Complaint  Annual Exam (Physical )    Subjective          Maryanne Reid presents to [facility]  History of Present Illness  Patient in today for routine physical exam as well as medication recheck and refill.  She states her blood pressure has been doing well.  Denies any chest pain or shortness of breath.  She would like to do next fasting labs on her next visit.  She states she has had some mild nasal congestion for the last couple days.  Denies any fever denies any known sick contacts.  Denies any nausea, vomiting, or diarrhea.  Hypertension  This is a chronic problem. The problem is controlled. Pertinent negatives include no blurred vision, chest pain, peripheral edema or shortness of breath. Current antihypertension treatment includes angiotensin blockers.   URI   This is a new problem. The current episode started in the past 7 days. Associated symptoms include congestion. Pertinent negatives include no abdominal pain, chest pain, coughing, diarrhea, dysuria, ear pain, nausea, sore throat, vomiting or wheezing.       Objective   Vital Signs:   /80   Pulse 101   Ht 165.1 cm (65\")   Wt 106 kg (233 lb)   SpO2 98%   BMI 38.77 kg/m²     Body mass index is 38.77 kg/m².    Review of Systems   Constitutional:  Negative for fever.   HENT:  Positive for congestion. Negative for ear pain and sore throat.    Eyes:  Negative for blurred vision.   Respiratory:  Negative for cough, shortness of breath and wheezing.    Cardiovascular:  Negative for chest pain.   Gastrointestinal:  Negative for abdominal pain, diarrhea, nausea and vomiting.   Genitourinary:  Negative for dysuria and frequency.   Neurological:  Negative for dizziness and headache.       Past History:  Medical History: has a past medical history of Cluster headache (2000), Headache, tension-type (2010), Hypertension (2022), and Migraine (2010).   Surgical History: has a past surgical history that includes orthopedic " surgery; Left oophorectomy; and Cholecystectomy.   Family History: family history includes Diabetes in her paternal grandmother; Hypertension in her father and mother.   Social History: reports that she has never smoked. She has never been exposed to tobacco smoke. She has never used smokeless tobacco. She reports that she does not drink alcohol and does not use drugs.      Current Outpatient Medications:     Amino Acids (COMPLEX ESSENTIAL MSD PO), Take  by mouth. 6 tablets daily, Disp: , Rfl:     Cholecalciferol (Vitamin D-3) 125 MCG (5000 UT) tablet, Take  by mouth., Disp: , Rfl:     Cranberry 500 MG capsule, Take  by mouth., Disp: , Rfl:     Levonorgestrel (Mirena, 52 MG,) 20 MCG/DAY intrauterine device IUD, , Disp: , Rfl:     losartan (COZAAR) 50 MG tablet, Take 1 tablet by mouth Daily., Disp: 90 tablet, Rfl: 1    multivitamin with minerals (ONE-A-DAY WOMENS PO), Take 1 tablet by mouth Daily., Disp: , Rfl:     NON FORMULARY, Chromium complex 6 tablets daily for carbohydrate digestion, Disp: , Rfl:     nortriptyline (Pamelor) 10 MG capsule, Take 1 capsule by mouth Every Night., Disp: 30 capsule, Rfl: 2    promethazine (PHENERGAN) 25 MG tablet, Take 1 tablet by mouth Every 6 (Six) Hours As Needed for Nausea or Vomiting., Disp: 15 tablet, Rfl: 0    Rimegepant Sulfate (NURTEC) 75 MG tablet dispersible tablet, Take 1 tablet by mouth As Needed (Take 1 tablet at the onset of headache, Max of 75 mg/24 hours, Max of 18 tabs/30 days.)., Disp: 16 tablet, Rfl: 5    VITAMIN E 1000 UNIT capsule, Take 1 capsule by mouth Daily., Disp: , Rfl:     amoxicillin (AMOXIL) 500 MG capsule, Take 1 capsule by mouth 3 (Three) Times a Day for 10 days., Disp: 30 capsule, Rfl: 0  Allergies: Patient has no known allergies.    Physical Exam  Constitutional:       Appearance: Normal appearance.   HENT:      Right Ear: Tympanic membrane normal.      Left Ear: Tympanic membrane normal.      Mouth/Throat:      Pharynx: Oropharynx is clear.   Eyes:       Conjunctiva/sclera: Conjunctivae normal.      Pupils: Pupils are equal, round, and reactive to light.   Cardiovascular:      Rate and Rhythm: Normal rate and regular rhythm.      Heart sounds: Normal heart sounds.   Pulmonary:      Effort: Pulmonary effort is normal.      Breath sounds: Normal breath sounds.   Abdominal:      Palpations: Abdomen is soft.      Tenderness: There is no abdominal tenderness.   Neurological:      Mental Status: She is oriented to person, place, and time.   Psychiatric:         Mood and Affect: Mood normal.         Behavior: Behavior normal.             Assessment and Plan   Diagnoses and all orders for this visit:    1. Routine physical examination (Primary)  Encouraged healthy diet and exercise. Will check fasting labs on next visit. Encouraged to stay utd on eye, dental and gyn exams.   2. Hypertension, essential  Refilled losartan.   3. Upper respiratory tract infection, unspecified type  Offered covid/flu testing- patient decline. Recommend symptom management at this time. If symptoms persist, may fill antibiotic if needed. RTC if not improving.   Other orders  -     losartan (COZAAR) 50 MG tablet; Take 1 tablet by mouth Daily.  Dispense: 90 tablet; Refill: 1  -     amoxicillin (AMOXIL) 500 MG capsule; Take 1 capsule by mouth 3 (Three) Times a Day for 10 days.  Dispense: 30 capsule; Refill: 0            Follow Up   No follow-ups on file.  Patient was given instructions and counseling regarding her condition or for health maintenance advice. Please see specific information pulled into the AVS if appropriate.     Ann Marie Singletary PA-C

## 2023-10-27 RX ORDER — AMOXICILLIN 500 MG/1
500 CAPSULE ORAL 3 TIMES DAILY
Qty: 30 CAPSULE | Refills: 0 | Status: SHIPPED | OUTPATIENT
Start: 2023-10-27 | End: 2023-11-06

## 2023-10-30 ENCOUNTER — HOSPITAL ENCOUNTER (OUTPATIENT)
Dept: MRI IMAGING | Facility: HOSPITAL | Age: 38
Discharge: HOME | End: 2023-10-30
Payer: OTHER GOVERNMENT

## 2023-10-30 DIAGNOSIS — G43.009 MIGRAINE WITHOUT AURA AND WITHOUT STATUS MIGRAINOSUS, NOT INTRACTABLE: ICD-10-CM

## 2023-10-30 PROCEDURE — 70551 MRI BRAIN STEM W/O DYE: CPT

## 2023-10-31 ENCOUNTER — TELEPHONE (OUTPATIENT)
Dept: NEUROLOGY | Facility: CLINIC | Age: 38
End: 2023-10-31
Payer: OTHER GOVERNMENT

## 2023-10-31 NOTE — TELEPHONE ENCOUNTER
----- Message from SHANTANU Cohen sent at 10/30/2023  5:01 PM EDT -----  Please notify Maryanne that her MRI brain did not show any significant abnormalities within the brain, it did show several nodules on the posterior scalp - does she have any raised bumps on the back of her head/has she seen dermatology previously for these?

## 2023-11-14 ENCOUNTER — LAB (OUTPATIENT)
Dept: LAB | Facility: HOSPITAL | Age: 38
End: 2023-11-14
Payer: OTHER GOVERNMENT

## 2023-11-14 ENCOUNTER — OFFICE VISIT (OUTPATIENT)
Dept: NEUROLOGY | Facility: CLINIC | Age: 38
End: 2023-11-14
Payer: OTHER GOVERNMENT

## 2023-11-14 VITALS
BODY MASS INDEX: 38.93 KG/M2 | SYSTOLIC BLOOD PRESSURE: 106 MMHG | HEART RATE: 88 BPM | OXYGEN SATURATION: 100 % | HEIGHT: 65 IN | DIASTOLIC BLOOD PRESSURE: 84 MMHG | WEIGHT: 233.69 LBS

## 2023-11-14 DIAGNOSIS — G43.009 MIGRAINE WITHOUT AURA AND WITHOUT STATUS MIGRAINOSUS, NOT INTRACTABLE: ICD-10-CM

## 2023-11-14 DIAGNOSIS — G43.009 MIGRAINE WITHOUT AURA AND WITHOUT STATUS MIGRAINOSUS, NOT INTRACTABLE: Primary | ICD-10-CM

## 2023-11-14 LAB
ALBUMIN SERPL-MCNC: 4.7 G/DL (ref 3.5–5.2)
ALBUMIN/GLOB SERPL: 1.4 G/DL
ALP SERPL-CCNC: 64 U/L (ref 39–117)
ALT SERPL W P-5'-P-CCNC: 16 U/L (ref 1–33)
ANION GAP SERPL CALCULATED.3IONS-SCNC: 11.2 MMOL/L (ref 5–15)
AST SERPL-CCNC: 17 U/L (ref 1–32)
BILIRUB SERPL-MCNC: 0.2 MG/DL (ref 0–1.2)
BUN SERPL-MCNC: 10 MG/DL (ref 6–20)
BUN/CREAT SERPL: 13 (ref 7–25)
CALCIUM SPEC-SCNC: 9.9 MG/DL (ref 8.6–10.5)
CHLORIDE SERPL-SCNC: 101 MMOL/L (ref 98–107)
CO2 SERPL-SCNC: 25.8 MMOL/L (ref 22–29)
CREAT SERPL-MCNC: 0.77 MG/DL (ref 0.57–1)
EGFRCR SERPLBLD CKD-EPI 2021: 102.7 ML/MIN/1.73
GLOBULIN UR ELPH-MCNC: 3.3 GM/DL
GLUCOSE SERPL-MCNC: 87 MG/DL (ref 65–99)
POTASSIUM SERPL-SCNC: 4.5 MMOL/L (ref 3.5–5.2)
PROT SERPL-MCNC: 8 G/DL (ref 6–8.5)
SODIUM SERPL-SCNC: 138 MMOL/L (ref 136–145)

## 2023-11-14 PROCEDURE — 36415 COLL VENOUS BLD VENIPUNCTURE: CPT

## 2023-11-14 PROCEDURE — 99214 OFFICE O/P EST MOD 30 MIN: CPT

## 2023-11-14 PROCEDURE — 80053 COMPREHEN METABOLIC PANEL: CPT

## 2023-11-14 RX ORDER — NORTRIPTYLINE HYDROCHLORIDE 25 MG/1
25 CAPSULE ORAL NIGHTLY
Qty: 30 CAPSULE | Refills: 2 | Status: SHIPPED | OUTPATIENT
Start: 2023-11-14 | End: 2024-02-12

## 2023-11-14 NOTE — PROGRESS NOTES
"   Neuro Office Visit      Encounter Date: 2023   Patient Name: Maryanne Reid  : 1985   MRN: [id number]   PCP:  Ann Marie Singletary PA-C     Chief Complaint:    Chief Complaint   Patient presents with    Migraine without aura and without status migrainosus, not i       History of Present Illness: Maryanne Reid is a 38 y.o. female who is here today in Neurology for migraines  Initial visit 2023:  Maryanne Reid is a 38 y.o. female who is here today in Neurology for  migraines.     PMH of allergic rhinitis, HTN, and headaches.      Patient was seen by PCP on 2023 and at that visit reported migraines for which she takes Maxalt as needed, has also tried Nurtec.  Requested refill of promethazine if she keeps it on hand for migraines.  Reported that she continued to have headaches but they have gotten better since seeing her chiropractor but still having them frequently.       Migraines have been present for 20+ years, they have become more frequent and intense, usually located on forehead, + light/ sound sensitivity, + nausea, notes during migraine her vision is \"fuzzy\", some weeks she can have up to 3 migraine days, triggers include menstrual cycle, stress, BP, sinus congestion, dehydration, barometric pressure changes. She denies visual aura.      FH: Mom and brother with MATHEUS; no family history of migraines   She did recently have an eye appointment - She does have astigmatism but reports that otherwise her visit was normal     Abortive: Rizatriptan - doesn't do much, Sumatriptan caused SE of claustrophobia. Nurtec wasn't previously covered by insurance - has been given samples which were helpful, Promethazine  Preventive: Nothing      Sleep is okay, she is a light sleeper and often wakes up at night  + Snoring   Feels that her balance is chronically poor    Current visit 2023:  Maryanne Reid returns to neurology clinic for continued evaluation of migraines. She has been taking Nortriptyline " "10 mg without SE. She is currently having about 11 migraine days per month. She has not noted any significant improvement with Nortriptyline, she does feel that Nurtec helps if she takes it at the start of a migraine, she sometimes will wait to see if it is going to be a \"true migraine\" and finds that it doesn't work as well when she does this. MRI Brain was performed on 10/30/2023 which per impression- no significant abnormality is identified within the brain, no diffusion restriction is identified to suggest acute infarct, mild mucosal thickening within the bilateral maxillary sinuses, multiple T1/T2 hypointense nodules noted within the posterior scalp soft tissues, possibly sebaceous cysts measuring up to 2 cm.  She does report history of cysts, some of which have been surgically removed. She has been referred to sleep medicine and has upcoming appointment on 12/8/2023.     Subjective      Review of Systems   Constitutional: Negative.    Eyes:  Positive for photophobia.   Respiratory:          Snoring while sleeping   Gastrointestinal:  Positive for nausea.   Genitourinary: Negative.    Musculoskeletal:  Positive for gait problem.   Allergic/Immunologic: Negative.    Neurological:  Positive for headaches.          Past Medical History:   Past Medical History:   Diagnosis Date    Cluster headache 2000    Since i was a child    Headache, tension-type 2010    Most of my adult life    Hypertension 2022    Migraine 2010    Most of my adult life       Past Surgical History:   Past Surgical History:   Procedure Laterality Date    CHOLECYSTECTOMY      LEFT OOPHORECTOMY      ORTHOPEDIC SURGERY         Family History:   Family History   Problem Relation Age of Onset    Hypertension Mother     Hypertension Father     Diabetes Paternal Grandmother        Social History:   Social History     Socioeconomic History    Marital status:    Tobacco Use    Smoking status: Never     Passive exposure: Never    Smokeless " "tobacco: Never   Vaping Use    Vaping Use: Never used   Substance and Sexual Activity    Alcohol use: Never     Alcohol/week: 1.0 standard drink of alcohol     Types: 1 Standard drinks or equivalent per week    Drug use: Never    Sexual activity: Yes     Partners: Male     Birth control/protection: I.U.D.     Comment: only have one ovary and tube because of large cyst.       Medications:     Current Outpatient Medications:     Amino Acids (COMPLEX ESSENTIAL MSD PO), Take  by mouth. 6 tablets daily, Disp: , Rfl:     Cholecalciferol (Vitamin D-3) 125 MCG (5000 UT) tablet, Take  by mouth., Disp: , Rfl:     Cranberry 500 MG capsule, Take  by mouth., Disp: , Rfl:     Levonorgestrel (Mirena, 52 MG,) 20 MCG/DAY intrauterine device IUD, , Disp: , Rfl:     losartan (COZAAR) 50 MG tablet, Take 1 tablet by mouth Daily., Disp: 90 tablet, Rfl: 1    multivitamin with minerals (ONE-A-DAY WOMENS PO), Take 1 tablet by mouth Daily., Disp: , Rfl:     NON FORMULARY, Chromium complex 6 tablets daily for carbohydrate digestion, Disp: , Rfl:     NON FORMULARY, Protefood, Disp: , Rfl:     nortriptyline (Pamelor) 25 MG capsule, Take 1 capsule by mouth Every Night for 90 days., Disp: 30 capsule, Rfl: 2    promethazine (PHENERGAN) 25 MG tablet, Take 1 tablet by mouth Every 6 (Six) Hours As Needed for Nausea or Vomiting., Disp: 15 tablet, Rfl: 0    Rimegepant Sulfate (NURTEC) 75 MG tablet dispersible tablet, Take 1 tablet by mouth As Needed (Take 1 tablet at the onset of headache, Max of 75 mg/24 hours, Max of 18 tabs/30 days.)., Disp: 16 tablet, Rfl: 5    VITAMIN E 1000 UNIT capsule, Take 1 capsule by mouth Daily., Disp: , Rfl:     Allergies:   No Known Allergies        Objective     Objective:    /84   Pulse 88   Ht 165.1 cm (65\")   Wt 106 kg (233 lb 11 oz)   SpO2 100%   BMI 38.89 kg/m²   Body mass index is 38.89 kg/m².    Physical Exam  Vitals reviewed.   Constitutional:       Appearance: Normal appearance.   HENT:      Head: " Normocephalic and atraumatic.      Mouth/Throat:      Mouth: Mucous membranes are moist.      Pharynx: Oropharynx is clear.   Pulmonary:      Effort: Pulmonary effort is normal. No respiratory distress.   Skin:     General: Skin is warm and dry.   Neurological:      Mental Status: She is alert.          Neurology Exam:    General apperance: NAD.     Mental status: Alert, awake and oriented to time place and person.    Fund of knowledge:  Normal.     Language and Speech: No aphasia or dysarthria.    Naming , Repitition and Comprehension:  Can name objects, repeat a sentence and follow commands. Speech is clear and fluent with good repetition, comprehension, and naming.    Cranial Nerves:   CN II: Visual fields are full. Intact. Pupils - PERRLA  CN III, IV and VI: Extraocular movements are intact. Normal saccades.   CN V: Facial sensation is intact.   CN VII: Muscles of facial expression reveal no asymmetry. Intact.   CN VIII: Hearing is intact.   CN IX and X: Palate elevates symmetrically. Intact  CN XI: Shoulder shrug is intact.   CN XII: Tongue is midline without evidence of atrophy or fasciculation.     Motor:  Right UE muscle strength 5/5. Normal tone.     Left UE muscle strength 5/5. Normal tone.      Right LE muscle strength 5/5. Normal tone.     Left LE muscle strength 5/5. Normal tone.      Sensory: Normal light touch sensation bilaterally.    DTRs: 2+ bilaterally in upper and lower extremities.    Coordination: Normal finger-to-nose, heel to shin B/L.    Gait: Normal. No assistive device.          Results:   Imaging:   MRI Brain Without Contrast    Result Date: 10/30/2023  1.No significant abnormality is identified within the brain. 2.No diffusion restriction is identified to suggest acute infarct. 3.Mild mucosal thickening within the bilateral maxillary sinuses. 4.Multiple T1/T2 hypointense nodules noted within the posterior scalp soft tissues, possibly sebaceous cysts measuring up to 2 cm. Please correlate  with findings on physical examination. Electronically Signed: Supa Moraes MD  10/30/2023 9:28 AM EDT  Workstation ID: [id number]       Labs:   Lab Results   Component Value Date    GLUCOSE 92 06/08/2023    BUN 13 06/08/2023    CREATININE 0.76 06/08/2023    EGFRRESULT 104 06/08/2023    BCR 17 06/08/2023    K 4.4 06/08/2023    CO2 20 06/08/2023    CALCIUM 9.2 06/08/2023    PROTENTOTREF 7.7 06/08/2023    ALBUMIN 4.5 06/08/2023    BILITOT 0.3 06/08/2023    AST 14 06/08/2023    ALT 12 06/08/2023     WBC   Date Value Ref Range Status   06/08/2023 7.4 3.4 - 10.8 x10E3/uL Final   07/10/2021 11.48 (H) 3.70 - 10.30 10*3/uL Final     RBC   Date Value Ref Range Status   06/08/2023 4.70 3.77 - 5.28 x10E6/uL Final   07/10/2021 4.24 3.90 - 5.20 10*6/uL Final     Hemoglobin   Date Value Ref Range Status   06/08/2023 13.7 11.1 - 15.9 g/dL Final   07/10/2021 12.4 11.2 - 15.7 g/dL Final     Hematocrit   Date Value Ref Range Status   06/08/2023 40.9 34.0 - 46.6 % Final   07/10/2021 38.4 34.0 - 45.0 % Final     MCV   Date Value Ref Range Status   06/08/2023 87 79 - 97 fL Final   07/10/2021 91 79 - 98 fL Final     MCH   Date Value Ref Range Status   06/08/2023 29.1 26.6 - 33.0 pg Final   07/10/2021 29.2 26.0 - 32.0 pg Final     MCHC   Date Value Ref Range Status   06/08/2023 33.5 31.5 - 35.7 g/dL Final   07/10/2021 32.3 30.7 - 35.5 g/dL Final     RDW   Date Value Ref Range Status   06/08/2023 12.7 11.7 - 15.4 % Final   07/10/2021 13.8 11.5 - 14.5 % Final     MPV   Date Value Ref Range Status   07/10/2021 9.4 8.8 - 12.5 fL Final     Platelets   Date Value Ref Range Status   06/08/2023 346 150 - 450 x10E3/uL Final   07/10/2021 316 155 - 369 10*3/uL Final     Neutrophil Rel %   Date Value Ref Range Status   06/08/2023 66 Not Estab. % Final     Lymphocyte Rel %   Date Value Ref Range Status   06/08/2023 27 Not Estab. % Final     Monocyte Rel %   Date Value Ref Range Status   06/08/2023 6 Not Estab. % Final     Eosinophil Rel %   Date Value  Ref Range Status   06/08/2023 0 Not Estab. % Final     Basophil Rel %   Date Value Ref Range Status   06/08/2023 1 Not Estab. % Final     Neutrophils Absolute   Date Value Ref Range Status   06/08/2023 4.9 1.4 - 7.0 x10E3/uL Final     Lymphocytes Absolute   Date Value Ref Range Status   06/08/2023 2.0 0.7 - 3.1 x10E3/uL Final     Monocytes Absolute   Date Value Ref Range Status   06/08/2023 0.4 0.1 - 0.9 x10E3/uL Final     Eosinophils Absolute   Date Value Ref Range Status   06/08/2023 0.0 0.0 - 0.4 x10E3/uL Final     Basophils Absolute   Date Value Ref Range Status   06/08/2023 0.1 0.0 - 0.2 x10E3/uL Final     nRBC   Date Value Ref Range Status   07/10/2021 0.0 <=0.0 per 100 WBCs Final     TSH          6/8/2023    13:16   TSH   TSH 0.838          Assessment / Plan      Assessment/Plan:   Diagnoses and all orders for this visit:    1. Migraine without aura and without status migrainosus, not intractable (Primary)  -     Comprehensive Metabolic Panel; Future  -     nortriptyline (Pamelor) 25 MG capsule; Take 1 capsule by mouth Every Night for 90 days.  Dispense: 30 capsule; Refill: 2     Maryanne Reid returns to neurology clinic for continued evaluation of migraines. At her initial clinic visit she was started on Nortriptyline 10 mg nightly which she has tolerated well without SE, unfortunately she has not seen much benefit from this medication, will increase to 25 mg nightly, I have asked that she contact clinic in 2-4 weeks with response, she has been advised that higher dose may cause sleepiness. Nurtec has been working well as an abortive option when she takes it at the start of a migraine, we discussed today the importance of taking this at the very start of symptoms for increased benefit, she is to notify clinic if Nurtec is not working well as an abortive option when taken at the onset of migraine as we can consider switching to Ubrelvy. Will plan to see Maryanne back in clinic in 3 months or sooner for any  questions or concerns.     Patient Education:       Reviewed medications, potential side effects and signs and symptoms to report. Discussed risk versus benefits of treatment plan with patient and/or family-including medications, labs and radiology that may be ordered. Addressed questions and concerns during visit. Patient and/or family verbalized understanding and agree with plan. Instructed to call the office with any questions and report to ER with any life-threatening symptoms.     Follow Up:   Return in about 3 months (around 2/14/2024).    I spent 30  minutes in the care of this patient and family. I personally spent 50 percent of this time counseling and discussing diagnosis, diagnostic testing, evaluation, treatment options, and management .       During this visit the following were done:  Labs Reviewed [x]    Labs Ordered [x]    Radiology Reports Reviewed [x]    Radiology Ordered []    PCP Records Reviewed []    Referring Provider Records Reviewed []    ER Records Reviewed []    Hospital Records Reviewed []    History Obtained From Family []    Radiology Images Reviewed [x]    Other Reviewed []    Records Requested []      SHANTANU Cohen  [facility]  [address] [address] [phone]

## 2023-11-16 ENCOUNTER — TELEPHONE (OUTPATIENT)
Dept: NEUROLOGY | Facility: CLINIC | Age: 38
End: 2023-11-16
Payer: OTHER GOVERNMENT

## 2023-11-16 NOTE — TELEPHONE ENCOUNTER
----- Message from SHANTANU Cohen sent at 11/15/2023  4:03 PM EST -----  Please notify Maryanne that her CMP was stable.

## 2023-12-08 ENCOUNTER — TELEMEDICINE (OUTPATIENT)
Dept: SLEEP MEDICINE | Facility: CLINIC | Age: 38
End: 2023-12-08
Payer: OTHER GOVERNMENT

## 2023-12-08 VITALS — BODY MASS INDEX: 38.32 KG/M2 | WEIGHT: 230 LBS | HEIGHT: 65 IN

## 2023-12-08 DIAGNOSIS — R06.83 SNORING: ICD-10-CM

## 2023-12-08 DIAGNOSIS — R29.818 SUSPECTED SLEEP APNEA: ICD-10-CM

## 2023-12-08 DIAGNOSIS — R51.9 MORNING HEADACHE: ICD-10-CM

## 2023-12-08 DIAGNOSIS — I10 ESSENTIAL HYPERTENSION: Primary | ICD-10-CM

## 2023-12-08 PROCEDURE — 99213 OFFICE O/P EST LOW 20 MIN: CPT | Performed by: NURSE PRACTITIONER

## 2023-12-08 NOTE — PROGRESS NOTES
Chief Complaint:   Chief Complaint   Patient presents with    Sleeping Problem       HPI:    Maryanne Reid is a 38 y.o. female here to establish care.  Patient sees Ms. Hayder WALTERS for primary care and Dr. Emma Zaman for neurology.  Patient history as below:  Past Medical History:   Diagnosis Date    Cluster headache 2000    Since i was a child    Headache, tension-type 2010    Most of my adult life    Hypertension 2022    Migraine 2010    Most of my adult life       Patient has a greater than 5-year history of snoring, excessive daytime sleepiness, frequent awakenings, morning headaches, nasal allergies, and difficulty falling and staying asleep.  Patient does deny nasal fracture, concussion, hypnagogic hallucinations and sleep paralysis.    During the weekday patient goes to bed 11 PM and awaken 6:15 AM.  On the weekend going to bed 11 PM awakening 10 AM.  She estimates getting anywhere from 5 to 7 hours of sleep nightly.  It would usually take her 30 minutes to 1 hour to go to sleep and she is not rested upon awakening.  Patient will only get up occasionally x 1.  Patient has an Welch score of 6/24.    We did speak today about the consequences of untreated sleep apnea such as hypertension, atrial fibrillation, stroke, early onset dementia and sudden cardiac death.  We also discussed different therapies available to her such as CPAP, MAD, or ENT referral.  Patient verbalizes understanding.    Social history  Is a very pleasant 38-year-old female.  Patient is a teacher.  Patient is a non-smoker, nondrinker and denies illicit substance use.  She will have 2 to 3 glasses of regular tea daily and anywhere from 0-1:'s.    Family History   Problem Relation Age of Onset    Hypertension Mother     Hypertension Father     Diabetes Paternal Grandmother      Past Surgical History:   Procedure Laterality Date    CHOLECYSTECTOMY      LEFT OOPHORECTOMY      ORTHOPEDIC SURGERY             Current medications are:   Current  Outpatient Medications:     Cholecalciferol (Vitamin D-3) 125 MCG (5000 UT) tablet, Take  by mouth., Disp: , Rfl:     Cranberry 500 MG capsule, Take  by mouth., Disp: , Rfl:     Levonorgestrel (Mirena, 52 MG,) 20 MCG/DAY intrauterine device IUD, , Disp: , Rfl:     losartan (COZAAR) 50 MG tablet, Take 1 tablet by mouth Daily., Disp: 90 tablet, Rfl: 1    multivitamin with minerals (ONE-A-DAY WOMENS PO), Take 1 tablet by mouth Daily., Disp: , Rfl:     NON FORMULARY, Chromium complex 6 tablets daily for carbohydrate digestion, Disp: , Rfl:     NON FORMULARY, Protefood, Disp: , Rfl:     nortriptyline (Pamelor) 25 MG capsule, Take 1 capsule by mouth Every Night for 90 days., Disp: 30 capsule, Rfl: 2    promethazine (PHENERGAN) 25 MG tablet, Take 1 tablet by mouth Every 6 (Six) Hours As Needed for Nausea or Vomiting., Disp: 15 tablet, Rfl: 0    Rimegepant Sulfate (NURTEC) 75 MG tablet dispersible tablet, Take 1 tablet by mouth As Needed (Take 1 tablet at the onset of headache, Max of 75 mg/24 hours, Max of 18 tabs/30 days.)., Disp: 16 tablet, Rfl: 5    VITAMIN E 1000 UNIT capsule, Take 1 capsule by mouth Daily., Disp: , Rfl: .      The patient's relevant past medical, surgical, family and social history were reviewed and updated in Epic as appropriate.       Review of Systems   Constitutional:  Positive for fatigue.   HENT:  Positive for congestion, sinus pressure and sinus pain.    Gastrointestinal:  Positive for abdominal pain and constipation.   Musculoskeletal:  Positive for back pain, neck pain and neck stiffness.   Allergic/Immunologic: Positive for environmental allergies.   Neurological:  Positive for light-headedness and headaches.   Hematological:  Bruises/bleeds easily.   Psychiatric/Behavioral:  Positive for sleep disturbance.          Objective:    Physical Exam  Constitutional:       Appearance: Normal appearance.   HENT:      Head: Normocephalic and atraumatic.   Pulmonary:      Effort: Pulmonary effort is  normal. No respiratory distress.   Neurological:      Mental Status: She is alert and oriented to person, place, and time.   Psychiatric:         Mood and Affect: Mood normal.         Behavior: Behavior normal.         Thought Content: Thought content normal.         Judgment: Judgment normal.             ASSESSMENT/PLAN    Diagnoses and all orders for this visit:    1. Essential hypertension (Primary)  -     Home Sleep Study; Future    2. Suspected sleep apnea  -     Home Sleep Study; Future    3. Snoring  -     Home Sleep Study; Future    4. Morning headache  -     Home Sleep Study; Future        Counseled patient regarding multimodal approach with healthy nutrition, healthy sleep, regular physical activity, social activities, counseling, and medications. Encouraged to practice lateral sleep position. Avoid alcohol and sedatives close to bedtime.      Certainly has a strong story for sleep apnea and due to the consequences of untreated sleep apnea we will move forward with HST and follow-up as appropriate.  Patient is in [place], at home, and gave consent for video visit.  Thank you for this kind referral  I have reviewed the results of my evaluation and impression and discussed my recommendations in detail with the patient.      Signed by  SHANTANU Cancino    December 8, 2023      CC: Ann Marie Singletary, Emma Walton APRN

## 2023-12-16 DIAGNOSIS — G43.009 MIGRAINE WITHOUT AURA AND WITHOUT STATUS MIGRAINOSUS, NOT INTRACTABLE: ICD-10-CM

## 2023-12-18 RX ORDER — NORTRIPTYLINE HYDROCHLORIDE 25 MG/1
25 CAPSULE ORAL NIGHTLY
Qty: 30 CAPSULE | Refills: 2 | Status: SHIPPED | OUTPATIENT
Start: 2023-12-18

## 2023-12-18 RX ORDER — NORTRIPTYLINE HYDROCHLORIDE 10 MG/1
10 CAPSULE ORAL NIGHTLY
Qty: 30 CAPSULE | Refills: 2 | OUTPATIENT
Start: 2023-12-18 | End: 2024-12-17

## 2023-12-18 NOTE — TELEPHONE ENCOUNTER
Rx Refill Note  Requested Prescriptions     Pending Prescriptions Disp Refills    nortriptyline (PAMELOR) 10 MG capsule [Pharmacy Med Name: NORTRIPTYLINE 10MG CAPSULES] 30 capsule 2     Sig: TAKE 1 CAPSULE BY MOUTH EVERY NIGHT      Last filled:11/14/23 with 2 RF sent in   Last office visit with prescribing clinician: 11/14/2023      Next office visit with prescribing clinician: 2/15/2024     Yolanda Vieira MA  12/18/23, 09:57 EST

## 2024-01-29 ENCOUNTER — HOSPITAL ENCOUNTER (OUTPATIENT)
Dept: SLEEP MEDICINE | Facility: HOSPITAL | Age: 39
Discharge: HOME | End: 2024-01-29
Admitting: NURSE PRACTITIONER
Payer: OTHER GOVERNMENT

## 2024-01-29 DIAGNOSIS — R29.818 SUSPECTED SLEEP APNEA: ICD-10-CM

## 2024-01-29 DIAGNOSIS — R06.83 SNORING: ICD-10-CM

## 2024-01-29 DIAGNOSIS — R51.9 MORNING HEADACHE: ICD-10-CM

## 2024-01-29 DIAGNOSIS — I10 ESSENTIAL HYPERTENSION: ICD-10-CM

## 2024-01-29 PROCEDURE — 95806 SLEEP STUDY UNATT&RESP EFFT: CPT

## 2024-02-15 ENCOUNTER — OFFICE VISIT (OUTPATIENT)
Dept: NEUROLOGY | Facility: CLINIC | Age: 39
End: 2024-02-15
Payer: OTHER GOVERNMENT

## 2024-02-15 VITALS
OXYGEN SATURATION: 96 % | BODY MASS INDEX: 38.32 KG/M2 | DIASTOLIC BLOOD PRESSURE: 84 MMHG | WEIGHT: 230 LBS | HEIGHT: 65 IN | HEART RATE: 116 BPM | SYSTOLIC BLOOD PRESSURE: 126 MMHG

## 2024-02-15 DIAGNOSIS — G43.009 MIGRAINE WITHOUT AURA AND WITHOUT STATUS MIGRAINOSUS, NOT INTRACTABLE: Primary | ICD-10-CM

## 2024-02-15 DIAGNOSIS — G44.209 TENSION HEADACHE: ICD-10-CM

## 2024-02-15 PROCEDURE — 99214 OFFICE O/P EST MOD 30 MIN: CPT

## 2024-02-15 RX ORDER — NORTRIPTYLINE HYDROCHLORIDE 25 MG/1
50 CAPSULE ORAL NIGHTLY
Qty: 60 CAPSULE | Refills: 2 | Status: SHIPPED | OUTPATIENT
Start: 2024-02-15 | End: 2024-05-15

## 2024-02-15 RX ORDER — NEEDLELESS DISPENSING PIN
6 EACH MISCELLANEOUS DAILY
COMMUNITY
Start: 2023-12-07 | End: 2024-02-15

## 2024-02-29 ENCOUNTER — PATIENT MESSAGE (OUTPATIENT)
Dept: NEUROLOGY | Facility: CLINIC | Age: 39
End: 2024-02-29
Payer: OTHER GOVERNMENT

## 2024-02-29 DIAGNOSIS — G43.009 MIGRAINE WITHOUT AURA AND WITHOUT STATUS MIGRAINOSUS, NOT INTRACTABLE: Primary | ICD-10-CM

## 2024-02-29 DIAGNOSIS — G44.209 TENSION HEADACHE: ICD-10-CM

## 2024-02-29 RX ORDER — PROPRANOLOL HYDROCHLORIDE 20 MG/1
TABLET ORAL
Qty: 67 TABLET | Refills: 2 | Status: SHIPPED | OUTPATIENT
Start: 2024-02-29 | End: 2024-04-06

## 2024-02-29 NOTE — TELEPHONE ENCOUNTER
From: Maryanne Reid  To: Emma Zaman  Sent: 2/29/2024 10:36 AM EST  Subject: Increase in medicine dosage response    I have had 5 headaches/migraines since my last visit on 2/15 and the mg dosage increase of the medicine. Most have been due to the drastic change in the temp or weather. I do think that the increase in dosage is helping me sleep better, but I don’t see any benefit to it preventing headaches.

## 2024-05-08 ENCOUNTER — TELEPHONE (OUTPATIENT)
Dept: NEUROLOGY | Facility: CLINIC | Age: 39
End: 2024-05-08
Payer: OTHER GOVERNMENT

## 2024-05-21 DIAGNOSIS — G43.009 MIGRAINE WITHOUT AURA AND WITHOUT STATUS MIGRAINOSUS, NOT INTRACTABLE: ICD-10-CM

## 2024-05-21 RX ORDER — LOSARTAN POTASSIUM 50 MG/1
50 TABLET ORAL DAILY
Qty: 30 TABLET | Refills: 0 | Status: SHIPPED | OUTPATIENT
Start: 2024-05-21 | End: 2024-05-23 | Stop reason: SDUPTHER

## 2024-05-21 RX ORDER — PROMETHAZINE HYDROCHLORIDE 25 MG/1
25 TABLET ORAL EVERY 6 HOURS PRN
Qty: 15 TABLET | Refills: 0 | OUTPATIENT
Start: 2024-05-21

## 2024-05-21 RX ORDER — NORTRIPTYLINE HYDROCHLORIDE 25 MG/1
50 CAPSULE ORAL NIGHTLY
Qty: 60 CAPSULE | Refills: 3 | Status: SHIPPED | OUTPATIENT
Start: 2024-05-21

## 2024-05-21 RX ORDER — LOSARTAN POTASSIUM 50 MG/1
50 TABLET ORAL DAILY
Qty: 90 TABLET | OUTPATIENT
Start: 2024-05-21

## 2024-05-21 NOTE — TELEPHONE ENCOUNTER
Rx Refill Note  Requested Prescriptions     Pending Prescriptions Disp Refills    nortriptyline (PAMELOR) 25 MG capsule [Pharmacy Med Name: NORTRIPTYLINE 25MG CAPSULES] 60 capsule 2     Sig: TAKE 2 CAPSULES BY MOUTH EVERY NIGHT      Last filled:2/15/24 2 refill  Last office visit with prescribing clinician: 2/15/2024      Next office visit with prescribing clinician: 9/10/2024     DONAVON MOYER  05/21/24, 10:51 EDT    Sent to pharmacy with 3 refills

## 2024-05-22 ENCOUNTER — PRIOR AUTHORIZATION (OUTPATIENT)
Dept: NEUROLOGY | Facility: CLINIC | Age: 39
End: 2024-05-22
Payer: OTHER GOVERNMENT

## 2024-05-23 ENCOUNTER — OFFICE VISIT (OUTPATIENT)
Dept: FAMILY MEDICINE CLINIC | Facility: CLINIC | Age: 39
End: 2024-05-23
Payer: OTHER GOVERNMENT

## 2024-05-23 VITALS
OXYGEN SATURATION: 98 % | DIASTOLIC BLOOD PRESSURE: 80 MMHG | SYSTOLIC BLOOD PRESSURE: 126 MMHG | BODY MASS INDEX: 41.15 KG/M2 | HEART RATE: 88 BPM | WEIGHT: 247 LBS | HEIGHT: 65 IN

## 2024-05-23 DIAGNOSIS — Z13.1 DIABETES MELLITUS SCREENING: ICD-10-CM

## 2024-05-23 DIAGNOSIS — Z13.220 LIPID SCREENING: ICD-10-CM

## 2024-05-23 DIAGNOSIS — Z11.59 ENCOUNTER FOR HEPATITIS C SCREENING TEST FOR LOW RISK PATIENT: ICD-10-CM

## 2024-05-23 DIAGNOSIS — I10 ESSENTIAL HYPERTENSION: Primary | ICD-10-CM

## 2024-05-23 PROCEDURE — 99214 OFFICE O/P EST MOD 30 MIN: CPT | Performed by: PHYSICIAN ASSISTANT

## 2024-05-23 RX ORDER — LOSARTAN POTASSIUM 50 MG/1
50 TABLET ORAL DAILY
Qty: 90 TABLET | Refills: 1 | Status: SHIPPED | OUTPATIENT
Start: 2024-05-23

## 2024-05-23 NOTE — TELEPHONE ENCOUNTER
Outcome  Approved today  CaseId:[id number];Status:Approved;Review Type:Prior Auth;Coverage Start Date:04/22/2024;Coverage End Date:12/31/2099;;CaseId:[id number];Status:Denied;Review Type:Prior Auth;Appeal Information:;

## 2024-05-23 NOTE — PROGRESS NOTES
"Chief Complaint  Hypertension (Med check )    Subjective          Maryanne Reid presents to [facility]IMARY [facility]  History of Present Illness  Patient in today for medication recheck and refill. States blood pressure has been doing well. Is here fasting for labs. Denies chest pain or shortness of breath. She states is due for gyn exam and will call and schedule her own appointment.   Hypertension  This is a chronic problem. Pertinent negatives include no blurred vision, chest pain, palpitations, peripheral edema or shortness of breath.       Objective   Vital Signs:   /80   Pulse 88   Ht 165.1 cm (65\")   Wt 112 kg (247 lb)   SpO2 98%   BMI 41.10 kg/m²     Body mass index is 41.1 kg/m².    Review of Systems   Constitutional:  Negative for fatigue and fever.   HENT:  Negative for congestion and sore throat.    Eyes:  Negative for blurred vision.   Respiratory:  Negative for cough and shortness of breath.    Cardiovascular:  Negative for chest pain and palpitations.   Gastrointestinal:  Negative for abdominal pain, diarrhea, nausea and vomiting.   Neurological:  Negative for dizziness and headache.   Psychiatric/Behavioral:  Negative for depressed mood.        Past History:  Medical History: has a past medical history of Cluster headache (2000), Headache, tension-type (2010), Hypertension (2022), and Migraine (2010).   Surgical History: has a past surgical history that includes orthopedic surgery; Left oophorectomy; and Cholecystectomy.   Family History: family history includes Anxiety disorder in her mother; Arthritis in her mother; Depression in her mother; Diabetes in her paternal grandmother; Hyperlipidemia in her father; Hypertension in her father and mother.   Social History: reports that she has never smoked. She has never been exposed to tobacco smoke. She has never used smokeless tobacco. She reports that she does not drink alcohol and does not use drugs.      Current Outpatient " Medications:     Cholecalciferol (Vitamin D-3) 125 MCG (5000 UT) tablet, Take  by mouth., Disp: , Rfl:     Cranberry 500 MG capsule, Take  by mouth., Disp: , Rfl:     Levonorgestrel (Mirena, 52 MG,) 20 MCG/DAY intrauterine device IUD, , Disp: , Rfl:     losartan (COZAAR) 50 MG tablet, Take 1 tablet by mouth Daily., Disp: 90 tablet, Rfl: 1    multivitamin with minerals (ONE-A-DAY WOMENS PO), Take 1 tablet by mouth Daily., Disp: , Rfl:     nortriptyline (PAMELOR) 25 MG capsule, TAKE 2 CAPSULES BY MOUTH EVERY NIGHT, Disp: 60 capsule, Rfl: 3    promethazine (PHENERGAN) 25 MG tablet, Take 1 tablet by mouth Every 6 (Six) Hours As Needed for Nausea or Vomiting., Disp: 15 tablet, Rfl: 0    propranolol (INDERAL) 20 MG tablet, Take 1 tablet by mouth Daily for 7 days, THEN 1 tablet 2 (Two) Times a Day for 30 days., Disp: 67 tablet, Rfl: 2    Rimegepant Sulfate (NURTEC) 75 MG tablet dispersible tablet, Take 1 tablet by mouth As Needed (Take 1 tablet at the onset of headache, Max of 75 mg/24 hours, Max of 18 tabs/30 days.)., Disp: 16 tablet, Rfl: 5    VITAMIN E 1000 UNIT capsule, Take 1 capsule by mouth Daily., Disp: , Rfl:   Allergies: Patient has no known allergies.    Physical Exam  Constitutional:       Appearance: Normal appearance.   HENT:      Right Ear: Tympanic membrane normal.      Left Ear: Tympanic membrane normal.      Mouth/Throat:      Pharynx: Oropharynx is clear.   Eyes:      Conjunctiva/sclera: Conjunctivae normal.      Pupils: Pupils are equal, round, and reactive to light.   Cardiovascular:      Rate and Rhythm: Normal rate and regular rhythm.      Heart sounds: Normal heart sounds.   Pulmonary:      Effort: Pulmonary effort is normal.      Breath sounds: Normal breath sounds.   Abdominal:      Palpations: Abdomen is soft.      Tenderness: There is no abdominal tenderness.   Musculoskeletal:      Right lower leg: No edema.      Left lower leg: No edema.   Neurological:      Mental Status: She is oriented to  person, place, and time.   Psychiatric:         Mood and Affect: Mood normal.         Behavior: Behavior normal.             Assessment and Plan   Diagnoses and all orders for this visit:    1. Essential hypertension (Primary)  -     CBC & Differential  -     Comprehensive Metabolic Panel  -     TSH  Refilled losartan. Encouraged healthy diet and exercise. Fasting labs today. RTC prior to recheck as needed.   2. Lipid screening  -     Lipid Panel  Will check fasting lipid panel with labs.   3. Encounter for hepatitis C screening test for low risk patient  -     Hepatitis C Antibody    4. Diabetes mellitus screening  -     Hemoglobin A1c  Will check hga1c with labs.   Other orders  -     losartan (COZAAR) 50 MG tablet; Take 1 tablet by mouth Daily.  Dispense: 90 tablet; Refill: 1            Follow Up   Return in about 6 months (around 11/23/2024).  Patient was given instructions and counseling regarding her condition or for health maintenance advice. Please see specific information pulled into the AVS if appropriate.     Ann Marie Singletary PA-C

## 2024-05-24 LAB
ALBUMIN SERPL-MCNC: 4.4 G/DL (ref 3.9–4.9)
ALBUMIN/GLOB SERPL: 1.4 {RATIO} (ref 1.2–2.2)
ALP SERPL-CCNC: 72 IU/L (ref 44–121)
ALT SERPL-CCNC: 20 IU/L (ref 0–32)
AST SERPL-CCNC: 17 IU/L (ref 0–40)
BASOPHILS # BLD AUTO: 0.1 X10E3/UL (ref 0–0.2)
BASOPHILS NFR BLD AUTO: 1 %
BILIRUB SERPL-MCNC: 0.2 MG/DL (ref 0–1.2)
BUN SERPL-MCNC: 10 MG/DL (ref 6–20)
BUN/CREAT SERPL: 12 (ref 9–23)
CALCIUM SERPL-MCNC: 9.5 MG/DL (ref 8.7–10.2)
CHLORIDE SERPL-SCNC: 102 MMOL/L (ref 96–106)
CHOLEST SERPL-MCNC: 195 MG/DL (ref 100–199)
CO2 SERPL-SCNC: 25 MMOL/L (ref 20–29)
CREAT SERPL-MCNC: 0.82 MG/DL (ref 0.57–1)
EGFRCR SERPLBLD CKD-EPI 2021: 94 ML/MIN/1.73
EOSINOPHIL # BLD AUTO: 0 X10E3/UL (ref 0–0.4)
EOSINOPHIL NFR BLD AUTO: 0 %
ERYTHROCYTE [DISTWIDTH] IN BLOOD BY AUTOMATED COUNT: 13.2 % (ref 11.7–15.4)
GLOBULIN SER CALC-MCNC: 3.1 G/DL (ref 1.5–4.5)
GLUCOSE SERPL-MCNC: 98 MG/DL (ref 70–99)
HBA1C MFR BLD: 5.9 % (ref 4.8–5.6)
HCT VFR BLD AUTO: 39.8 % (ref 34–46.6)
HCV IGG SERPL QL IA: NON REACTIVE
HDLC SERPL-MCNC: 46 MG/DL
HGB BLD-MCNC: 13 G/DL (ref 11.1–15.9)
IMM GRANULOCYTES # BLD AUTO: 0 X10E3/UL (ref 0–0.1)
IMM GRANULOCYTES NFR BLD AUTO: 0 %
LDLC SERPL CALC-MCNC: 115 MG/DL (ref 0–99)
LYMPHOCYTES # BLD AUTO: 2.1 X10E3/UL (ref 0.7–3.1)
LYMPHOCYTES NFR BLD AUTO: 30 %
MCH RBC QN AUTO: 28.7 PG (ref 26.6–33)
MCHC RBC AUTO-ENTMCNC: 32.7 G/DL (ref 31.5–35.7)
MCV RBC AUTO: 88 FL (ref 79–97)
MONOCYTES # BLD AUTO: 0.4 X10E3/UL (ref 0.1–0.9)
MONOCYTES NFR BLD AUTO: 6 %
NEUTROPHILS # BLD AUTO: 4.4 X10E3/UL (ref 1.4–7)
NEUTROPHILS NFR BLD AUTO: 63 %
PLATELET # BLD AUTO: 318 X10E3/UL (ref 150–450)
POTASSIUM SERPL-SCNC: 5.2 MMOL/L (ref 3.5–5.2)
PROT SERPL-MCNC: 7.5 G/DL (ref 6–8.5)
RBC # BLD AUTO: 4.53 X10E6/UL (ref 3.77–5.28)
SODIUM SERPL-SCNC: 141 MMOL/L (ref 134–144)
TRIGL SERPL-MCNC: 197 MG/DL (ref 0–149)
TSH SERPL DL<=0.005 MIU/L-ACNC: 1 UIU/ML (ref 0.45–4.5)
VLDLC SERPL CALC-MCNC: 34 MG/DL (ref 5–40)
WBC # BLD AUTO: 7.1 X10E3/UL (ref 3.4–10.8)

## 2024-05-29 ENCOUNTER — TELEPHONE (OUTPATIENT)
Dept: FAMILY MEDICINE CLINIC | Facility: CLINIC | Age: 39
End: 2024-05-29

## 2024-05-29 DIAGNOSIS — G43.009 MIGRAINE WITHOUT AURA AND WITHOUT STATUS MIGRAINOSUS, NOT INTRACTABLE: ICD-10-CM

## 2024-05-29 DIAGNOSIS — G44.209 TENSION HEADACHE: ICD-10-CM

## 2024-05-29 RX ORDER — PROPRANOLOL HYDROCHLORIDE 20 MG/1
TABLET ORAL
Qty: 60 TABLET | Refills: 2 | Status: SHIPPED | OUTPATIENT
Start: 2024-05-29

## 2024-05-29 NOTE — TELEPHONE ENCOUNTER
Rx Refill Note  Requested Prescriptions     Pending Prescriptions Disp Refills    propranolol (INDERAL) 20 MG tablet [Pharmacy Med Name: PROPRANOLOL 20MG TABLETS] 67 tablet 2     Sig: TAKE 1 TABLET BY MOUTH DAILY FOR 7 DAYS THEN TAKE 1 TABLET BY MOUTH TWICE DAILY FOR 30 DAYS      Last filled:2/29/24 2 refills  Last office visit with prescribing clinician: 2/15/2024      Next office visit with prescribing clinician: 9/10/2024     DONAVON MOYER  05/29/24, 11:13 EDT    Sent to pharmacy 2 refills

## 2024-08-26 DIAGNOSIS — G44.209 TENSION HEADACHE: ICD-10-CM

## 2024-08-26 DIAGNOSIS — G43.009 MIGRAINE WITHOUT AURA AND WITHOUT STATUS MIGRAINOSUS, NOT INTRACTABLE: ICD-10-CM

## 2024-08-26 RX ORDER — PROPRANOLOL HYDROCHLORIDE 20 MG/1
TABLET ORAL
Qty: 60 TABLET | Refills: 0 | Status: SHIPPED | OUTPATIENT
Start: 2024-08-26

## 2024-08-26 NOTE — TELEPHONE ENCOUNTER
Rx Refill Note  Requested Prescriptions     Pending Prescriptions Disp Refills    propranolol (INDERAL) 20 MG tablet [Pharmacy Med Name: PROPRANOLOL 20MG TABLETS] 60 tablet 2     Sig: TAKE 1 TABLET BY MOUTH TWICE DAILY      Last filled:5/29/24 2 refill  Last office visit with prescribing clinician: 2/15/2024      Next office visit with prescribing clinician: 9/10/2024     DONAVON MOYER  08/26/24, 08:00 EDT    Sent to pharmacy with 0 refill until upcoming appt

## 2024-09-10 ENCOUNTER — OFFICE VISIT (OUTPATIENT)
Dept: NEUROLOGY | Facility: CLINIC | Age: 39
End: 2024-09-10
Payer: OTHER GOVERNMENT

## 2024-09-10 VITALS
HEART RATE: 99 BPM | HEIGHT: 65 IN | OXYGEN SATURATION: 97 % | BODY MASS INDEX: 41.15 KG/M2 | SYSTOLIC BLOOD PRESSURE: 118 MMHG | WEIGHT: 247 LBS | DIASTOLIC BLOOD PRESSURE: 88 MMHG

## 2024-09-10 DIAGNOSIS — G43.009 MIGRAINE WITHOUT AURA AND WITHOUT STATUS MIGRAINOSUS, NOT INTRACTABLE: Primary | ICD-10-CM

## 2024-09-10 DIAGNOSIS — G44.209 TENSION HEADACHE: ICD-10-CM

## 2024-09-10 RX ORDER — PROPRANOLOL HCL 60 MG
60 CAPSULE, EXTENDED RELEASE 24HR ORAL DAILY
Qty: 30 CAPSULE | Refills: 2 | Status: SHIPPED | OUTPATIENT
Start: 2024-09-10 | End: 2024-12-09

## 2024-09-10 RX ORDER — NORTRIPTYLINE HCL 25 MG
50 CAPSULE ORAL NIGHTLY
Qty: 60 CAPSULE | Refills: 3 | Status: SHIPPED | OUTPATIENT
Start: 2024-09-10

## 2024-09-10 RX ORDER — MULTIVIT WITH MINERALS/LUTEIN
1000 TABLET ORAL DAILY
COMMUNITY

## 2024-09-10 NOTE — PROGRESS NOTES
"   Neuro Office Visit      Encounter Date: 09/10/2024   Patient Name: Maryanne Reid  : 1985   MRN: [id number]   PCP:  Ann Marie Singletary PA-C     Chief Complaint:    Chief Complaint   Patient presents with    Migraine without aura and without status migrainosus, not i       History of Present Illness: Maryanne Reid is a 39 y.o. female who is here today in Neurology for migraines    Initial visit 2023:  Maryanne Reid is a 38 y.o. female who is here today in Neurology for  migraines.     PMH of allergic rhinitis, HTN, and headaches.      Patient was seen by PCP on 2023 and at that visit reported migraines for which she takes Maxalt as needed, has also tried Nurtec.  Requested refill of promethazine if she keeps it on hand for migraines.  Reported that she continued to have headaches but they have gotten better since seeing her chiropractor but still having them frequently.       Migraines have been present for 20+ years, they have become more frequent and intense, usually located on forehead, + light/ sound sensitivity, + nausea, notes during migraine her vision is \"fuzzy\", some weeks she can have up to 3 migraine days, triggers include menstrual cycle, stress, BP, sinus congestion, dehydration, barometric pressure changes. She denies visual aura.      FH: Mom and brother with MATHEUS; no family history of migraines   She did recently have an eye appointment - She does have astigmatism but reports that otherwise her visit was normal     Abortive: Rizatriptan - doesn't do much, Sumatriptan caused SE of claustrophobia. Nurtec wasn't previously covered by insurance - has been given samples which were helpful, Promethazine  Preventive: Nothing      Sleep is okay, she is a light sleeper and often wakes up at night  + Snoring   Feels that her balance is chronically poor     Follow up visit 2023:  Maryanne Reid returns to neurology clinic for continued evaluation of migraines. She has been taking " "Nortriptyline 10 mg without SE. She is currently having about 11 migraine days per month. She has not noted any significant improvement with Nortriptyline, she does feel that Nurtec helps if she takes it at the start of a migraine, she sometimes will wait to see if it is going to be a \"true migraine\" and finds that it doesn't work as well when she does this. MRI Brain was performed on 10/30/2023 which per impression- no significant abnormality is identified within the brain, no diffusion restriction is identified to suggest acute infarct, mild mucosal thickening within the bilateral maxillary sinuses, multiple T1/T2 hypointense nodules noted within the posterior scalp soft tissues, possibly sebaceous cysts measuring up to 2 cm.  She does report history of cysts, some of which have been surgically removed. She has been referred to sleep medicine and has upcoming appointment on 12/8/2023.         Follow up visit 2/15/2024:  Maryanne Reid returns to neurology clinic for continued evaluation of migraines. She has been taking Nortriptyline 25 mg nightly without any SE, she feels that migraines are slightly better but overall is still experiencing about 10 headaches per month. She is also taking Nurtec 75 mg PRN with great relief of migraines, no SE with Nurtec. She finds that migraines are more apt to occur after work - she works as a teacher. She did see sleep medicine and underwent home sleep study which was negative/non-diagnostic.       Current visit 9/10/2024:  Maryanne Reid returns to neurology clinic for continued evaluation of migraines. Nortriptyline was increased to 50 mg at last clinic visit, in between clinic visits she reported that this increase was helping with sleep but not with migraines. She was started on Propranolol 20 mg BID and reported that this had been helping with migraines. She continues to take Nurtec 75 mg PRN rescue treatment with good results. In clinic today she reports that since starting " propranolol she has been having about 6-7 headache days/month, no severe migraines since January. Headaches tend to be left retroorbital but can be right retroorbital, no nausea/vomiting, some light sensitivity, no visual changes. She is a teacher and notes that stress/noise from her work can trigger headaches. No changes in health since last visit. Overall she is seeing a positive improvement in her migraine control with Nortriptyline, Propranolol, and PRN Nurtec.      Subjective      Review of Systems   HENT:          Recent sinus infection - congestion is improving   Eyes:  Positive for photophobia. Negative for visual disturbance.   Respiratory: Negative.     Cardiovascular: Negative.    Gastrointestinal:  Negative for nausea and vomiting.   Endocrine: Negative.    Genitourinary: Negative.    Musculoskeletal: Negative.    Skin: Negative.    Allergic/Immunologic: Positive for environmental allergies.   Neurological:  Positive for headaches. Negative for tremors, facial asymmetry, speech difficulty, weakness and numbness.   Psychiatric/Behavioral:          Sleeping well with Nortriptyline nightly          Past Medical History:   Past Medical History:   Diagnosis Date    Cluster headache 2000    Since i was a child    Headache, tension-type 2010    Most of my adult life    Hypertension 2022    Migraine 2010    Most of my adult life       Past Surgical History:   Past Surgical History:   Procedure Laterality Date    CHOLECYSTECTOMY      LEFT OOPHORECTOMY      ORTHOPEDIC SURGERY         Family History:   Family History   Problem Relation Age of Onset    Hypertension Mother     Anxiety disorder Mother     Arthritis Mother     Depression Mother     Hypertension Father     Hyperlipidemia Father     Diabetes Paternal Grandmother        Social History:   Social History     Socioeconomic History    Marital status:    Tobacco Use    Smoking status: Never     Passive exposure: Never    Smokeless tobacco: Never  "  Vaping Use    Vaping status: Never Used   Substance and Sexual Activity    Alcohol use: Never     Alcohol/week: 1.0 standard drink of alcohol     Types: 1 Standard drinks or equivalent per week    Drug use: Never    Sexual activity: Yes     Partners: Male     Birth control/protection: I.U.D.     Comment: only have one ovary and tube because of large cyst.       Medications:     Current Outpatient Medications:     ascorbic acid (VITAMIN C) 1000 MG tablet, Take 1 tablet by mouth Daily., Disp: , Rfl:     Cholecalciferol (Vitamin D-3) 125 MCG (5000 UT) tablet, Take  by mouth., Disp: , Rfl:     Cranberry 500 MG capsule, Take  by mouth., Disp: , Rfl:     Levonorgestrel (Mirena, 52 MG,) 20 MCG/DAY intrauterine device IUD, , Disp: , Rfl:     losartan (COZAAR) 50 MG tablet, Take 1 tablet by mouth Daily., Disp: 90 tablet, Rfl: 1    multivitamin with minerals (ONE-A-DAY WOMENS PO), Take 1 tablet by mouth Daily., Disp: , Rfl:     nortriptyline (PAMELOR) 25 MG capsule, Take 2 capsules by mouth Every Night., Disp: 60 capsule, Rfl: 3    promethazine (PHENERGAN) 25 MG tablet, Take 1 tablet by mouth Every 6 (Six) Hours As Needed for Nausea or Vomiting., Disp: 15 tablet, Rfl: 0    Rimegepant Sulfate (NURTEC) 75 MG tablet dispersible tablet, Take 1 tablet by mouth As Needed (Take 1 tablet at the onset of headache, Max of 75 mg/24 hours, Max of 18 tabs/30 days.)., Disp: 16 tablet, Rfl: 5    propranolol LA (INDERAL LA) 60 MG 24 hr capsule, Take 1 capsule by mouth Daily for 90 days., Disp: 30 capsule, Rfl: 2    Allergies:   No Known Allergies      Objective     Objective:    /88   Pulse 99   Ht 165.1 cm (65\")   Wt 112 kg (247 lb)   SpO2 97%   BMI 41.10 kg/m²   Body mass index is 41.1 kg/m².    Physical Exam  Vitals reviewed.   Constitutional:       Appearance: Normal appearance.   HENT:      Head: Normocephalic and atraumatic.      Mouth/Throat:      Mouth: Mucous membranes are moist.      Pharynx: Oropharynx is clear. "   Pulmonary:      Effort: Pulmonary effort is normal. No respiratory distress.   Skin:     General: Skin is warm and dry.   Neurological:      Mental Status: She is alert.          Neurology Exam:    General apperance: NAD.     Mental status: Alert, awake and oriented to time place and person.    Fund of knowledge:  Normal.     Language and Speech: No aphasia or dysarthria.    Naming , Repetition and Comprehension:  Can name objects, repeat a sentence and follow commands. Speech is clear and fluent with good repetition, comprehension, and naming.    Cranial Nerves:   CN II: Visual fields are full. Intact. Pupils - PERRLA  CN III, IV and VI: Extraocular movements are intact. Normal saccades.   CN V: Facial sensation is intact.   CN VII: Muscles of facial expression reveal no asymmetry. Intact.   CN VIII: Hearing is intact.  CN IX and X: Palate elevates symmetrically. Intact  CN XI: Shoulder shrug is intact.   CN XII: Tongue is midline without evidence of atrophy or fasciculation.     Motor:  Right UE muscle strength 5/5. Normal tone.     Left UE muscle strength 5/5. Normal tone.      Right LE muscle strength 5/5. Normal tone.     Left LE muscle strength 5/5. Normal tone.      Sensory: Normal light touch sensation bilaterally.    DTRs: 2+ bilaterally in upper and lower extremities.    Coordination: Normal finger-to-nose    Gait: Normal.          Results:   Imaging:   No Images in the past 120 days found..     Labs:   Lab Results   Component Value Date    GLUCOSE 98 05/23/2024    BUN 10 05/23/2024    CREATININE 0.82 05/23/2024     05/23/2024    K 5.2 05/23/2024     05/23/2024    CALCIUM 9.5 05/23/2024    PROTEINTOT 8.0 11/14/2023    ALBUMIN 4.4 05/23/2024    ALT 20 05/23/2024    AST 17 05/23/2024    ALKPHOS 72 05/23/2024    BILITOT 0.2 05/23/2024    GLOB 3.3 11/14/2023    AGRATIO 1.4 11/14/2023    BCR 12 05/23/2024    ANIONGAP 11.2 11/14/2023    EGFR 102.7 11/14/2023         Assessment / Plan       Assessment/Plan:   Diagnoses and all orders for this visit:    1. Migraine without aura and without status migrainosus, not intractable (Primary)  -     propranolol LA (INDERAL LA) 60 MG 24 hr capsule; Take 1 capsule by mouth Daily for 90 days.  Dispense: 30 capsule; Refill: 2  -     nortriptyline (PAMELOR) 25 MG capsule; Take 2 capsules by mouth Every Night.  Dispense: 60 capsule; Refill: 3    2. Tension headache  -     propranolol LA (INDERAL LA) 60 MG 24 hr capsule; Take 1 capsule by mouth Daily for 90 days.  Dispense: 30 capsule; Refill: 2       Maryanne Reid returns to neurology clinic for continued evaluation of migraines and tension headaches. She has responded well overall to Nortriptyline 50 mg nightly plus Propranolol 20 mg BID, she finds that Nortriptyline helps with sleep and Propranolol has helped to decrease headache frequency to 6-7/month, will increase Propranolol to Propranolol LA 60 mg daily today for increased headache benefit, she is to closely monitor BP and HR with adjusted dose and contact clinic if BP <110/60 or HR <60 or with any dizziness/fatigue. I have asked that Maryanne provide update in 2 weeks. Will continue PRN Nurtec 75 mg unchanged for rescue treatment. Will plan to see Maryanne back in clinic in 3 months or sooner for any questions or concerns.     Patient Education:       Reviewed medications, potential side effects and signs and symptoms to report. Discussed risk versus benefits of treatment plan with patient and/or family-including medications, labs and radiology that may be ordered. Addressed questions and concerns during visit. Patient and/or family verbalized understanding and agree with plan. Instructed to call the office with any questions and report to ER with any life-threatening symptoms.     Follow Up:   Return in about 3 months (around 12/10/2024).    I spent 30  minutes in the care of this patient. I personally spent 50 percent of this time counseling and  discussing diagnosis, diagnostic testing, evaluation, treatment options, and management .       During this visit the following were done:  Labs Reviewed [x]    Labs Ordered []    Radiology Reports Reviewed []    Radiology Ordered []    PCP Records Reviewed []    Referring Provider Records Reviewed []    ER Records Reviewed []    Hospital Records Reviewed []    History Obtained From Family []    Radiology Images Reviewed []    Other Reviewed []    Records Requested []      SHANTANU Cohen  [facility]  [address] [address] [phone]

## 2024-09-22 DIAGNOSIS — G43.009 MIGRAINE WITHOUT AURA AND WITHOUT STATUS MIGRAINOSUS, NOT INTRACTABLE: ICD-10-CM

## 2024-09-24 DIAGNOSIS — G43.009 MIGRAINE WITHOUT AURA AND WITHOUT STATUS MIGRAINOSUS, NOT INTRACTABLE: ICD-10-CM

## 2024-09-24 DIAGNOSIS — G44.209 TENSION HEADACHE: ICD-10-CM

## 2024-09-24 RX ORDER — PROPRANOLOL HCL 20 MG
TABLET ORAL
Qty: 60 TABLET | Refills: 0 | OUTPATIENT
Start: 2024-09-24

## 2024-09-28 ENCOUNTER — PATIENT MESSAGE (OUTPATIENT)
Dept: NEUROLOGY | Facility: CLINIC | Age: 39
End: 2024-09-28
Payer: OTHER GOVERNMENT

## 2024-10-01 NOTE — TELEPHONE ENCOUNTER
Spoke with pharmacy regarding approval and they stated the prescription had .  Did verbal with pharmacist for new script.

## 2024-11-25 ENCOUNTER — OFFICE VISIT (OUTPATIENT)
Dept: FAMILY MEDICINE CLINIC | Facility: CLINIC | Age: 39
End: 2024-11-25
Payer: OTHER GOVERNMENT

## 2024-11-25 VITALS
HEIGHT: 65 IN | OXYGEN SATURATION: 98 % | DIASTOLIC BLOOD PRESSURE: 82 MMHG | HEART RATE: 89 BPM | WEIGHT: 250 LBS | BODY MASS INDEX: 41.65 KG/M2 | SYSTOLIC BLOOD PRESSURE: 124 MMHG

## 2024-11-25 DIAGNOSIS — R73.9 HYPERGLYCEMIA: ICD-10-CM

## 2024-11-25 DIAGNOSIS — I10 HYPERTENSION, ESSENTIAL: Primary | ICD-10-CM

## 2024-11-25 PROCEDURE — 99214 OFFICE O/P EST MOD 30 MIN: CPT | Performed by: PHYSICIAN ASSISTANT

## 2024-11-25 RX ORDER — LOSARTAN POTASSIUM 50 MG/1
50 TABLET ORAL DAILY
Qty: 90 TABLET | Refills: 1 | Status: SHIPPED | OUTPATIENT
Start: 2024-11-25

## 2024-11-25 NOTE — PROGRESS NOTES
"Chief Complaint  Hypertension (Patient presents today for medication recheck)    Subjective          Maryanne Reid presents to [facility]  History of Present Illness  Patient in  today for medication recheck and refills.  She states her blood pressure has been doing well.  Denies any chest pain or shortness of breath.  She is also on medication for migraine management through her neurologist.  Last labs did show mild elevation to her hemoglobin A1c at 5.9.  She would like to have that rechecked today.  Hypertension  This is a chronic problem. The current episode started more than 1 year ago. The problem has been improved since onset. The problem is controlled. Associated symptoms include headaches. Pertinent negatives include no anxiety, blurred vision, chest pain, malaise/fatigue, orthopnea, palpitations, peripheral edema or shortness of breath. There are no associated agents to hypertension. Risk factors for coronary artery disease include family history and obesity. There are no compliance problems.    Additional comments: Seems to be controlled with medicine.      Objective   Vital Signs:   /82   Pulse 89   Ht 165.1 cm (65\")   Wt 113 kg (250 lb)   SpO2 98%   BMI 41.60 kg/m²     Body mass index is 41.6 kg/m².    Review of Systems   Constitutional:  Negative for fatigue, fever and malaise/fatigue.   HENT:  Negative for congestion and sore throat.    Eyes:  Negative for blurred vision.   Respiratory:  Negative for cough and shortness of breath.    Cardiovascular:  Negative for chest pain, palpitations and orthopnea.   Gastrointestinal:  Negative for abdominal pain, diarrhea, nausea and vomiting.   Genitourinary:  Negative for dysuria and frequency.   Neurological:  Negative for dizziness and headache.       Past History:  Medical History: has a past medical history of Cluster headache (2000), Headache, tension-type (2010), Hypertension (2022), and Migraine (2010). "   Surgical History: has a past surgical history that includes orthopedic surgery; Left oophorectomy; and Cholecystectomy.   Family History: family history includes Anxiety disorder in her mother; Arthritis in her mother; Depression in her mother; Diabetes in her paternal grandmother; Hyperlipidemia in her father; Hypertension in her father and mother.   Social History: reports that she has never smoked. She has never been exposed to tobacco smoke. She has never used smokeless tobacco. She reports that she does not drink alcohol and does not use drugs.      Current Outpatient Medications:     ascorbic acid (VITAMIN C) 1000 MG tablet, Take 1 tablet by mouth Daily., Disp: , Rfl:     Cholecalciferol (Vitamin D-3) 125 MCG (5000 UT) tablet, Take  by mouth., Disp: , Rfl:     Cranberry 500 MG capsule, Take  by mouth., Disp: , Rfl:     Levonorgestrel (Mirena, 52 MG,) 20 MCG/DAY intrauterine device IUD, , Disp: , Rfl:     losartan (COZAAR) 50 MG tablet, Take 1 tablet by mouth Daily., Disp: 90 tablet, Rfl: 1    multivitamin with minerals (ONE-A-DAY WOMENS PO), Take 1 tablet by mouth Daily., Disp: , Rfl:     nortriptyline (PAMELOR) 25 MG capsule, Take 2 capsules by mouth Every Night., Disp: 60 capsule, Rfl: 3    promethazine (PHENERGAN) 25 MG tablet, Take 1 tablet by mouth Every 6 (Six) Hours As Needed for Nausea or Vomiting., Disp: 15 tablet, Rfl: 0    propranolol LA (INDERAL LA) 60 MG 24 hr capsule, Take 1 capsule by mouth Daily for 90 days., Disp: 30 capsule, Rfl: 2    Rimegepant Sulfate (NURTEC) 75 MG tablet dispersible tablet, Take 1 tablet by mouth As Needed (Take 1 tablet at the onset of headache, Max of 75 mg/24 hours, Max of 18 tabs/30 days.)., Disp: 16 tablet, Rfl: 5  Allergies: Patient has no known allergies.    Physical Exam  Constitutional:       Appearance: Normal appearance.   HENT:      Right Ear: Tympanic membrane normal.      Left Ear: Tympanic membrane normal.      Mouth/Throat:      Pharynx: Oropharynx is  clear.   Eyes:      Conjunctiva/sclera: Conjunctivae normal.      Pupils: Pupils are equal, round, and reactive to light.   Cardiovascular:      Rate and Rhythm: Normal rate and regular rhythm.      Heart sounds: Normal heart sounds.   Pulmonary:      Effort: Pulmonary effort is normal.      Breath sounds: Normal breath sounds.   Neurological:      Mental Status: She is oriented to person, place, and time.   Psychiatric:         Mood and Affect: Mood normal.         Behavior: Behavior normal.             Assessment and Plan   Diagnoses and all orders for this visit:    1. Hypertension, essential (Primary)  Will continue on current dosage of losartan. Encouraged healthy diet and exercise. RTC prior to recheck as needed.   2. Hyperglycemia  -     Comprehensive Metabolic Panel  -     Hemoglobin A1c  Will recheck hga1c today. Encouraged healthy diet and exercise.   Other orders  -     losartan (COZAAR) 50 MG tablet; Take 1 tablet by mouth Daily.  Dispense: 90 tablet; Refill: 1            Follow Up   Return in about 6 months (around 5/25/2025) for Annual physical.  Patient was given instructions and counseling regarding her condition or for health maintenance advice. Please see specific information pulled into the AVS if appropriate.     Ann Marie Singletary PA-C

## 2024-11-26 ENCOUNTER — TELEPHONE (OUTPATIENT)
Dept: FAMILY MEDICINE CLINIC | Facility: CLINIC | Age: 39
End: 2024-11-26
Payer: OTHER GOVERNMENT

## 2024-11-26 LAB
ALBUMIN SERPL-MCNC: 4.5 G/DL (ref 3.9–4.9)
ALP SERPL-CCNC: 76 IU/L (ref 44–121)
ALT SERPL-CCNC: 15 IU/L (ref 0–32)
AST SERPL-CCNC: 16 IU/L (ref 0–40)
BILIRUB SERPL-MCNC: <0.2 MG/DL (ref 0–1.2)
BUN SERPL-MCNC: 9 MG/DL (ref 6–20)
BUN/CREAT SERPL: 13 (ref 9–23)
CALCIUM SERPL-MCNC: 9.3 MG/DL (ref 8.7–10.2)
CHLORIDE SERPL-SCNC: 103 MMOL/L (ref 96–106)
CO2 SERPL-SCNC: 24 MMOL/L (ref 20–29)
CREAT SERPL-MCNC: 0.7 MG/DL (ref 0.57–1)
EGFRCR SERPLBLD CKD-EPI 2021: 114 ML/MIN/1.73
GLOBULIN SER CALC-MCNC: 3.3 G/DL (ref 1.5–4.5)
GLUCOSE SERPL-MCNC: 99 MG/DL (ref 70–99)
HBA1C MFR BLD: 5.9 % (ref 4.8–5.6)
POTASSIUM SERPL-SCNC: 4.6 MMOL/L (ref 3.5–5.2)
PROT SERPL-MCNC: 7.8 G/DL (ref 6–8.5)
SODIUM SERPL-SCNC: 140 MMOL/L (ref 134–144)

## 2024-11-26 NOTE — TELEPHONE ENCOUNTER
LVM for pt to call us back     HUB TO RELAY     ----- Message from Ann Marie Singletary sent at 11/26/2024 11:52 AM EST -----  Please let know 3 month sugar avg remained same as last check in prediabetic range at 5.9-- please continue to work on healthy low carb diet and exercise; thanks

## 2024-12-05 ENCOUNTER — PATIENT MESSAGE (OUTPATIENT)
Dept: NEUROLOGY | Facility: CLINIC | Age: 39
End: 2024-12-05
Payer: OTHER GOVERNMENT

## 2024-12-05 DIAGNOSIS — G43.009 MIGRAINE WITHOUT AURA AND WITHOUT STATUS MIGRAINOSUS, NOT INTRACTABLE: ICD-10-CM

## 2024-12-05 DIAGNOSIS — G44.209 TENSION HEADACHE: ICD-10-CM

## 2024-12-05 RX ORDER — PROPRANOLOL HYDROCHLORIDE 60 MG/1
60 CAPSULE, EXTENDED RELEASE ORAL DAILY
Qty: 30 CAPSULE | Refills: 2 | Status: SHIPPED | OUTPATIENT
Start: 2024-12-05 | End: 2025-03-05

## 2024-12-05 NOTE — TELEPHONE ENCOUNTER
Rx Refill Note  Requested Prescriptions     Signed Prescriptions Disp Refills    propranolol LA (INDERAL LA) 60 MG 24 hr capsule 30 capsule 2     Sig: Take 1 capsule by mouth Daily for 90 days.     Authorizing Provider: YAYA WARD     Ordering User: DONAVON MOYER      Last filled:9/10/24 2 refill  Last office visit with prescribing clinician: 9/10/2024      Next office visit with prescribing clinician: 2/26/2025     DONAVON MOYER  12/05/24, 13:53 EST      Sent to pharmacy with 2 refills

## 2025-01-05 DIAGNOSIS — G43.009 MIGRAINE WITHOUT AURA AND WITHOUT STATUS MIGRAINOSUS, NOT INTRACTABLE: ICD-10-CM

## 2025-01-08 RX ORDER — NORTRIPTYLINE HYDROCHLORIDE 25 MG/1
50 CAPSULE ORAL NIGHTLY
Qty: 60 CAPSULE | Refills: 1 | Status: SHIPPED | OUTPATIENT
Start: 2025-01-08

## 2025-01-08 NOTE — TELEPHONE ENCOUNTER
Rx Refill Note  Requested Prescriptions     Pending Prescriptions Disp Refills    nortriptyline (PAMELOR) 25 MG capsule [Pharmacy Med Name: NORTRIPTYLINE 25MG CAPSULES] 60 capsule 3     Sig: TAKE 2 CAPSULES BY MOUTH EVERY NIGHT      Last filled:9/10/24 3 refill   Last office visit with prescribing clinician: 9/10/2024      Next office visit with prescribing clinician: 2/26/2025     DONAVON MOYER  01/08/25, 14:06 EST  Sent to pharmacy with 1 refill until upcoming appt

## 2025-02-26 ENCOUNTER — OFFICE VISIT (OUTPATIENT)
Dept: NEUROLOGY | Facility: CLINIC | Age: 40
End: 2025-02-26
Payer: OTHER GOVERNMENT

## 2025-02-26 VITALS
OXYGEN SATURATION: 96 % | DIASTOLIC BLOOD PRESSURE: 82 MMHG | HEART RATE: 108 BPM | WEIGHT: 250 LBS | BODY MASS INDEX: 41.65 KG/M2 | HEIGHT: 65 IN | SYSTOLIC BLOOD PRESSURE: 136 MMHG

## 2025-02-26 DIAGNOSIS — G44.209 TENSION HEADACHE: ICD-10-CM

## 2025-02-26 DIAGNOSIS — G43.009 MIGRAINE WITHOUT AURA AND WITHOUT STATUS MIGRAINOSUS, NOT INTRACTABLE: Primary | ICD-10-CM

## 2025-02-26 NOTE — PROGRESS NOTES
"   Neuro Office Visit      Encounter Date: 2025   Patient Name: Maryanne Reid  : 1985   MRN: [id number]   PCP:  Ann Marie Singletary PA-C     Chief Complaint:    Chief Complaint   Patient presents with    Follow-up       History of Present Illness: Maryanne Reid is a 39 y.o. female who is here today in Neurology for migraines    Initial visit 2023:  Maryanne Reid is a 38 y.o. female who is here today in Neurology for  migraines.     PMH of allergic rhinitis, HTN, and headaches.      Patient was seen by PCP on 2023 and at that visit reported migraines for which she takes Maxalt as needed, has also tried Nurtec.  Requested refill of promethazine if she keeps it on hand for migraines.  Reported that she continued to have headaches but they have gotten better since seeing her chiropractor but still having them frequently.       Migraines have been present for 20+ years, they have become more frequent and intense, usually located on forehead, + light/ sound sensitivity, + nausea, notes during migraine her vision is \"fuzzy\", some weeks she can have up to 3 migraine days, triggers include menstrual cycle, stress, BP, sinus congestion, dehydration, barometric pressure changes. She denies visual aura.      FH: Mom and brother with MATHEUS; no family history of migraines   She did recently have an eye appointment - She does have astigmatism but reports that otherwise her visit was normal     Abortive: Rizatriptan - doesn't do much, Sumatriptan caused SE of claustrophobia. Nurtec wasn't previously covered by insurance - has been given samples which were helpful, Promethazine  Preventive: Nothing      Sleep is okay, she is a light sleeper and often wakes up at night  + Snoring   Feels that her balance is chronically poor     Follow up visit 2023:  Maryanne Reid returns to neurology clinic for continued evaluation of migraines. She has been taking Nortriptyline 10 mg without SE. She is currently having " "about 11 migraine days per month. She has not noted any significant improvement with Nortriptyline, she does feel that Nurtec helps if she takes it at the start of a migraine, she sometimes will wait to see if it is going to be a \"true migraine\" and finds that it doesn't work as well when she does this. MRI Brain was performed on 10/30/2023 which per impression- no significant abnormality is identified within the brain, no diffusion restriction is identified to suggest acute infarct, mild mucosal thickening within the bilateral maxillary sinuses, multiple T1/T2 hypointense nodules noted within the posterior scalp soft tissues, possibly sebaceous cysts measuring up to 2 cm.  She does report history of cysts, some of which have been surgically removed. She has been referred to sleep medicine and has upcoming appointment on 12/8/2023.         Follow up visit 2/15/2024:  Maryanne Reid returns to neurology clinic for continued evaluation of migraines. She has been taking Nortriptyline 25 mg nightly without any SE, she feels that migraines are slightly better but overall is still experiencing about 10 headaches per month. She is also taking Nurtec 75 mg PRN with great relief of migraines, no SE with Nurtec. She finds that migraines are more apt to occur after work - she works as a teacher. She did see sleep medicine and underwent home sleep study which was negative/non-diagnostic.       Follow up visit 9/10/2024:  Maryanne Reid returns to neurology clinic for continued evaluation of migraines. Nortriptyline was increased to 50 mg at last clinic visit, in between clinic visits she reported that this increase was helping with sleep but not with migraines. She was started on Propranolol 20 mg BID and reported that this had been helping with migraines. She continues to take Nurtec 75 mg PRN rescue treatment with good results. In clinic today she reports that since starting propranolol she has been having about 6-7 headache " days/month, no severe migraines since January. Headaches tend to be left retroorbital but can be right retroorbital, no nausea/vomiting, some light sensitivity, no visual changes. She is a teacher and notes that stress/noise from her work can trigger headaches. No changes in health since last visit. Overall she is seeing a positive improvement in her migraine control with Nortriptyline, Propranolol, and PRN Nurtec.    Current visit 2/26/2025:  Maryanne Reid returns to neurology clinic for continued evaluation of migraines and tension headaches. She is currently taking Nortriptyline 50 mg nightly plus Propranolol LA 60 mg daily for headache prevention. She takes Nurtec 75 mg PRN rescue treatment. Still having 6-7 headaches/month, does see benefit from Nurtec, headaches often at the base of her neck. Some light/sound sensitivity. Peppermint oil is also helpful. She reports that severity has overall significantly improved from prior to taking preventive medication but would still like to get headaches under even better control. She denies visual changes. She continues to work as a teacher. Denies changes in health since last clinic visit. Not currently pregnant and no plans of conceiving.     Subjective      Review of Systems   Eyes:  Positive for photophobia. Negative for visual disturbance.   Respiratory: Negative.     Cardiovascular: Negative.    Gastrointestinal:  Negative for nausea and vomiting.   Endocrine: Negative.    Genitourinary: Negative.    Musculoskeletal: Negative.    Skin: Negative.    Allergic/Immunologic: Positive for environmental allergies.   Neurological:  Positive for headaches. Negative for tremors, facial asymmetry, speech difficulty, weakness and numbness.   Psychiatric/Behavioral:          Sleeping well with Nortriptyline nightly          Past Medical History:   Past Medical History:   Diagnosis Date    Cluster headache 2000    Since i was a child    Headache, tension-type 2010    Most of my  adult life    Hypertension 2022    Migraine 2010    Most of my adult life       Past Surgical History:   Past Surgical History:   Procedure Laterality Date    CHOLECYSTECTOMY      LEFT OOPHORECTOMY      ORTHOPEDIC SURGERY         Family History:   Family History   Problem Relation Age of Onset    Hypertension Mother     Anxiety disorder Mother     Arthritis Mother     Depression Mother     Hypertension Father     Hyperlipidemia Father     Diabetes Paternal Grandmother        Social History:   Social History     Socioeconomic History    Marital status:    Tobacco Use    Smoking status: Never     Passive exposure: Never    Smokeless tobacco: Never   Vaping Use    Vaping status: Never Used   Substance and Sexual Activity    Alcohol use: Never     Alcohol/week: 1.0 standard drink of alcohol     Types: 1 Standard drinks or equivalent per week    Drug use: Never    Sexual activity: Yes     Partners: Male     Birth control/protection: I.U.D.     Comment: only have one ovary and tube because of large cyst.       Medications:     Current Outpatient Medications:     ascorbic acid (VITAMIN C) 1000 MG tablet, Take 1 tablet by mouth Daily., Disp: , Rfl:     Cholecalciferol (Vitamin D-3) 125 MCG (5000 UT) tablet, Take  by mouth., Disp: , Rfl:     Cranberry 500 MG capsule, Take  by mouth., Disp: , Rfl:     Levonorgestrel (Mirena, 52 MG,) 20 MCG/DAY intrauterine device IUD, , Disp: , Rfl:     losartan (COZAAR) 50 MG tablet, Take 1 tablet by mouth Daily., Disp: 90 tablet, Rfl: 1    multivitamin with minerals (ONE-A-DAY WOMENS PO), Take 1 tablet by mouth Daily., Disp: , Rfl:     nortriptyline (PAMELOR) 25 MG capsule, TAKE 2 CAPSULES BY MOUTH EVERY NIGHT, Disp: 60 capsule, Rfl: 1    promethazine (PHENERGAN) 25 MG tablet, Take 1 tablet by mouth Every 6 (Six) Hours As Needed for Nausea or Vomiting., Disp: 15 tablet, Rfl: 0    propranolol LA (INDERAL LA) 60 MG 24 hr capsule, Take 1 capsule by mouth Daily for 90 days., Disp: 30  "capsule, Rfl: 2    Rimegepant Sulfate (NURTEC) 75 MG tablet dispersible tablet, Take 1 tablet by mouth As Needed (Take 1 tablet at the onset of headache, Max of 75 mg/24 hours, Max of 18 tabs/30 days.)., Disp: 16 tablet, Rfl: 5    Allergies:   No Known Allergies      Objective     Objective:    /82   Pulse 108   Ht 165.1 cm (65\")   Wt 113 kg (250 lb)   SpO2 96%   Breastfeeding No   BMI 41.60 kg/m²   Body mass index is 41.6 kg/m².    Physical Exam  Vitals reviewed.   Constitutional:       Appearance: Normal appearance.   HENT:      Head: Normocephalic and atraumatic.      Mouth/Throat:      Mouth: Mucous membranes are moist.      Pharynx: Oropharynx is clear.   Pulmonary:      Effort: Pulmonary effort is normal. No respiratory distress.   Skin:     General: Skin is warm and dry.   Neurological:      Mental Status: She is alert.          Neurology Exam:    General apperance: NAD.     Mental status: Alert, awake and oriented to time place and person.    Fund of knowledge:  Normal.     Language and Speech: No aphasia or dysarthria.    Naming , Repetition and Comprehension:  Can name objects, repeat a sentence and follow commands. Speech is clear and fluent with good repetition, comprehension, and naming.    Cranial Nerves:   CN II: Visual fields are full. Intact. Pupils - PERRLA  CN III, IV and VI: Extraocular movements are intact. Normal saccades.   CN V: Facial sensation is intact.   CN VII: Muscles of facial expression reveal no asymmetry. Intact.   CN VIII: Hearing is intact.  CN IX and X: Palate elevates symmetrically. Intact  CN XI: Shoulder shrug is intact.   CN XII: Tongue is midline without evidence of atrophy or fasciculation.     Motor:  Right UE muscle strength 5/5. Normal tone.     Left UE muscle strength 5/5. Normal tone.      Right LE muscle strength 5/5. Normal tone.     Left LE muscle strength 5/5. Normal tone.      Sensory: Normal light touch sensation bilaterally.    DTRs: 2+ bilaterally " in upper and lower extremities.    Coordination: Normal finger-to-nose    Gait: Normal.          Results:   Imaging:   No Images in the past 120 days found..     Labs:   Lab Results   Component Value Date    GLUCOSE 99 11/25/2024    BUN 9 11/25/2024    CREATININE 0.70 11/25/2024     11/25/2024    K 4.6 11/25/2024     11/25/2024    CALCIUM 9.3 11/25/2024    PROTEINTOT 7.8 11/25/2024    ALBUMIN 4.5 11/25/2024    ALT 15 11/25/2024    AST 16 11/25/2024    ALKPHOS 76 11/25/2024    BILITOT <0.2 11/25/2024    GLOB 3.3 11/25/2024    AGRATIO 1.4 05/23/2024    BCR 13 11/25/2024    ANIONGAP 11.2 11/14/2023    EGFR 114 11/25/2024         Assessment / Plan      Assessment/Plan:   Diagnoses and all orders for this visit:    1. Migraine without aura and without status migrainosus, not intractable (Primary)    2. Tension headache       Maryanne Reid returns to neurology clinic for continued evaluation of migraines and tension headaches. She is currently taking Nortriptyline 50 mg nightly plus Propranolol LA 60 mg daily for headache prevention. She takes Nurtec 75 mg PRN rescue treatment. Still having 6-7 headaches/month, does see benefit from Nurtec, given that she has had good trial of 2 both nortriptyline and propranolol with some benefit but still having 6-7 migraine days/month I have sent message to our pharmacy team to begin PA for Ajovy 225 mg monthly injectable. Pending her response to Ajovy we may consider starting to wean off of Propranolol. Will continue Nurtec unchanged. I have asked that Maryanne keep me updated with Ajovy and otherwise will plan to see back in clinic in 3 months.       Patient Education:       Reviewed medications, potential side effects and signs and symptoms to report. Discussed risk versus benefits of treatment plan with patient and/or family-including medications, labs and radiology that may be ordered. Addressed questions and concerns during visit. Patient and/or family verbalized  understanding and agree with plan. Instructed to call the office with any questions and report to ER with any life-threatening symptoms.     Follow Up:   Return in about 3 months (around 5/26/2025).    I spent 30  minutes in the care of this patient. I personally spent 50 percent of this time counseling and discussing diagnosis, diagnostic testing, evaluation, treatment options, and management .       During this visit the following were done:  Labs Reviewed [x]    Labs Ordered []    Radiology Reports Reviewed []    Radiology Ordered []    PCP Records Reviewed []    Referring Provider Records Reviewed []    ER Records Reviewed []    Hospital Records Reviewed []    History Obtained From Family []    Radiology Images Reviewed []    Other Reviewed []    Records Requested []      SHANTANU Cohen  [facility]  [address] [address] [phone]

## 2025-03-02 DIAGNOSIS — G44.209 TENSION HEADACHE: ICD-10-CM

## 2025-03-02 DIAGNOSIS — G43.009 MIGRAINE WITHOUT AURA AND WITHOUT STATUS MIGRAINOSUS, NOT INTRACTABLE: ICD-10-CM

## 2025-03-03 NOTE — TELEPHONE ENCOUNTER
Rx Refill Note  Requested Prescriptions     Pending Prescriptions Disp Refills    propranolol LA (INDERAL LA) 60 MG 24 hr capsule [Pharmacy Med Name: PROPRANOLOL ER 60MG CAPSULES] 30 capsule 2     Sig: TAKE 1 CAPSULE BY MOUTH DAILY      Last filled:12/5/24 2 ref  Last office visit with prescribing clinician: 2/26/2025      Next office visit with prescribing clinician: 5/27/2025     Ananya Jaimes MA  03/03/25, 10:12 EST      Sent to pharmacy

## 2025-03-04 RX ORDER — PROPRANOLOL HYDROCHLORIDE 60 MG/1
60 CAPSULE, EXTENDED RELEASE ORAL DAILY
Qty: 30 CAPSULE | Refills: 2 | Status: SHIPPED | OUTPATIENT
Start: 2025-03-04

## 2025-03-07 ENCOUNTER — SPECIALTY PHARMACY (OUTPATIENT)
Dept: NEUROLOGY | Facility: CLINIC | Age: 40
End: 2025-03-07
Payer: OTHER GOVERNMENT

## 2025-03-07 RX ORDER — GALCANEZUMAB 120 MG/ML
240 INJECTION, SOLUTION SUBCUTANEOUS ONCE
Qty: 2 ML | Refills: 0 | Status: SHIPPED | OUTPATIENT
Start: 2025-03-07 | End: 2025-03-07

## 2025-03-07 RX ORDER — GALCANEZUMAB 120 MG/ML
120 INJECTION, SOLUTION SUBCUTANEOUS
Qty: 3 ML | Refills: 3 | Status: SHIPPED | OUTPATIENT
Start: 2025-03-07

## 2025-03-20 DIAGNOSIS — G43.009 MIGRAINE WITHOUT AURA AND WITHOUT STATUS MIGRAINOSUS, NOT INTRACTABLE: ICD-10-CM

## 2025-03-20 RX ORDER — NORTRIPTYLINE HYDROCHLORIDE 25 MG/1
50 CAPSULE ORAL NIGHTLY
Qty: 60 CAPSULE | Refills: 1 | Status: SHIPPED | OUTPATIENT
Start: 2025-03-20

## 2025-03-20 NOTE — TELEPHONE ENCOUNTER
Pending response to Emgality may look at weaning Nortriptyline, will send refill as previously prescribed for now.

## 2025-03-20 NOTE — TELEPHONE ENCOUNTER
Rx Refill Note  Requested Prescriptions     Pending Prescriptions Disp Refills    nortriptyline (PAMELOR) 25 MG capsule [Pharmacy Med Name: NORTRIPTYLINE 25MG CAPSULES] 60 capsule 1     Sig: TAKE 2 CAPSULES BY MOUTH EVERY NIGHT      Last filled:1/8/25 1 ref  Last office visit with prescribing clinician: 2/26/2025      Next office visit with prescribing clinician: 5/27/2025     Ananya Jaimes MA  03/20/25, 10:09 EDT

## 2025-04-07 RX ORDER — GALCANEZUMAB 120 MG/ML
INJECTION, SOLUTION SUBCUTANEOUS
Qty: 2 ML | OUTPATIENT
Start: 2025-04-07

## 2025-04-07 NOTE — TELEPHONE ENCOUNTER
Rx Refill Note  Requested Prescriptions     Pending Prescriptions Disp Refills    Emgality 120 MG/ML auto-injector pen [Pharmacy Med Name: EMGALITY 120MG/ML AUTO INJECTOR 1ML] 2 mL      Sig: INJECT 2ML UNDER THE SKIN INTO THE APPROPRIATE AREA AS DIRECTED FOR 1 DOSE.      Last filled:3/7/25 3 ref  Last office visit with prescribing clinician: 2/26/2025      Next office visit with prescribing clinician: 5/27/2025     Ananya Jaimes MA  04/07/25, 10:24 EDT    Refills on file

## 2025-04-08 ENCOUNTER — SPECIALTY PHARMACY (OUTPATIENT)
Dept: GENERAL RADIOLOGY | Facility: HOSPITAL | Age: 40
End: 2025-04-08
Payer: OTHER GOVERNMENT

## 2025-04-08 RX ORDER — GALCANEZUMAB 120 MG/ML
120 INJECTION, SOLUTION SUBCUTANEOUS
Qty: 3 ML | Refills: 3 | Status: SHIPPED | OUTPATIENT
Start: 2025-04-08

## 2025-04-08 NOTE — PROGRESS NOTES
Specialty Pharmacy Patient Management Program  Neurology Initial Assessment     Maryanne Reid is a 40 y.o. female with migraines seen by a Neurology provider and enrolled in the Neurology Patient Management program offered by [facility].  An initial outreach was conducted, including assessment of therapy appropriateness and specialty medication education for Emgality. The patient was introduced to services offered by [facility], including: regular assessments, refill coordination, curbside pick-up or mail order delivery options, prior authorization maintenance, and financial assistance programs as applicable. The patient was also provided with contact information for the pharmacy team.     Insurance Coverage & Financial Support  [employer]  Emgality Copay Card    Relevant Past Medical History and Comorbidities  Relevant medical history and concomitant health conditions were discussed with the patient. The patient's chart has been reviewed for relevant past medical history and comorbid health conditions and updated as necessary.   Past Medical History:   Diagnosis Date    Cluster headache 2000    Since i was a child    Headache, tension-type 2010    Most of my adult life    Hypertension 2022    Migraine 2010    Most of my adult life     Social History     Socioeconomic History    Marital status:    Tobacco Use    Smoking status: Never     Passive exposure: Never    Smokeless tobacco: Never   Vaping Use    Vaping status: Never Used   Substance and Sexual Activity    Alcohol use: Never     Alcohol/week: 1.0 standard drink of alcohol     Types: 1 Standard drinks or equivalent per week    Drug use: Never    Sexual activity: Yes     Partners: Male     Birth control/protection: I.U.D.     Comment: only have one ovary and tube because of large cyst.     Problem list reviewed by Diane Martinez RP on 4/8/2025 at  4:10 PM    Allergies  Known allergies and reactions  were discussed with the patient. The patient's chart has been reviewed for  allergy information and updated as necessary.   No Known Allergies  Allergies reviewed by Diane Martinez [facility] on 4/8/2025 at  4:10 PM    Relevant Laboratory Values  Common labs          5/23/2024    09:23 11/25/2024    13:41   Common Labs   Glucose 98  99    BUN 10  9    Creatinine 0.82  0.70    Sodium 141  140    Potassium 5.2  4.6    Chloride 102  103    Calcium 9.5  9.3    Albumin 4.4  4.5    Total Bilirubin 0.2  <0.2    Alkaline Phosphatase 72  76    AST (SGOT) 17  16    ALT (SGPT) 20  15    WBC 7.1     Hemoglobin 13.0     Hematocrit 39.8     Platelets 318     Total Cholesterol 195     Triglycerides 197     HDL Cholesterol 46     LDL Cholesterol  115     Hemoglobin A1C 5.9  5.9        Current Medication List  This medication list has been reviewed with the patient and evaluated for any interactions or necessary modifications/recommendations, and updated to include all prescription medications, OTC medications, and supplements the patient is currently taking.  This list reflects what is contained in the patient's profile, which has also been marked as reviewed to communicate to other providers it is the most up to date version of the patient's current medication therapy.     Current Outpatient Medications:     galcanezumab-gnlm (Emgality) 120 MG/ML auto-injector pen, Inject 1 mL under the skin into the appropriate area as directed Every 30 (Thirty) Days., Disp: 3 mL, Rfl: 3    ascorbic acid (VITAMIN C) 1000 MG tablet, Take 1 tablet by mouth Daily., Disp: , Rfl:     Cholecalciferol (Vitamin D-3) 125 MCG (5000 UT) tablet, Take  by mouth., Disp: , Rfl:     Cranberry 500 MG capsule, Take  by mouth., Disp: , Rfl:     Levonorgestrel (Mirena, 52 MG,) 20 MCG/DAY intrauterine device IUD, , Disp: , Rfl:     losartan (COZAAR) 50 MG tablet, Take 1 tablet by mouth Daily., Disp: 90 tablet, Rfl: 1    multivitamin with minerals (ONE-A-DAY WOMENS  PO), Take 1 tablet by mouth Daily., Disp: , Rfl:     nortriptyline (PAMELOR) 25 MG capsule, TAKE 2 CAPSULES BY MOUTH EVERY NIGHT, Disp: 60 capsule, Rfl: 1    promethazine (PHENERGAN) 25 MG tablet, Take 1 tablet by mouth Every 6 (Six) Hours As Needed for Nausea or Vomiting., Disp: 15 tablet, Rfl: 0    propranolol LA (INDERAL LA) 60 MG 24 hr capsule, TAKE 1 CAPSULE BY MOUTH DAILY, Disp: 30 capsule, Rfl: 2    Rimegepant Sulfate (NURTEC) 75 MG tablet dispersible tablet, Take 1 tablet by mouth As Needed (Take 1 tablet at the onset of headache, Max of 75 mg/24 hours, Max of 18 tabs/30 days.)., Disp: 16 tablet, Rfl: 5    Medicines reviewed by Diane Martinez [facility] on 4/8/2025 at  4:10 PM    Drug Interactions  None     Initial Education Provided for Specialty Medication  The patient has been provided with the following education and any applicable administration techniques (i.e. self-injection) have been demonstrated for the therapies indicated. All questions and concerns have been addressed prior to the patient receiving the medication, and the patient has verbalized understanding of the education and any materials provided.  Additional patient education shall be provided and documented upon request by the patient, provider or payer.              Emgality (Galcanezumab-[facility])        Medication Expectations   Why am I taking this medication? You are taking this medication for migraine prophylaxis.   What should I expect while on this medication? You should expect to a decrease in the frequency and severity of your migraines.   How does the medication work? Emgality is a monoclonal antibody that binds to calcitonin gene-related peptide (CGRP) and blocks its binding to the receptor decreasing the severity of migraines.   How long will I be on this medication for? The amount of time you will be on this medication will be determined by your doctor and your response to the medication.    How do I take this medication? Take as  directed on your prescription label. This medication is a self-injection given every 28 days.    What are some possible side effects? Injection site reactions and hypersensitivity reactions.   What happens if I miss a dose? If you miss a dose, take it as soon as you remember, and time next dose 28 days from last dose.                  Medication Safety   What are things I should warn my doctor immediately about? Hypersensitivity reactions.   What are things that I should be cautious of? Injection site reaction.   What are some medications that can interact with this one? No drug interactions identified.            Medication Storage/Handling   How should I handle this medication? Keep this medication our of reach of pets/children in original container.  On the day your Emgality is due let it set at room temperature for 30 minutes prior to injection. (do NOT warm using a heat source such as hot water or a microwave).  Administer in the abdomen, thigh, back of the upper arm, or buttocks.  Do not inject where the skin is tender, bruised, red or hard.  Rotate injection sites.   How does this medication need to be stored? Store in refrigerator and keep dry.   How should I dispose of this medication? You can dispose of the empty syringe in a sharps container, and if this is not available you may use an empty hard plastic container such as a milk jug or tide container.            Resources/Support   How can I remind myself to take this medication? You can download a reminder [email or web address] on your phone or use a calandar  to help with your monthly injection.   Is financial support available?  Yes, [employer] can provide co-pay cards if you have commercial insurance or patient assistance if you have Medicare or no insurance.    Which vaccines are recommended for me? Talk to your doctor about these vaccines: Flu, Coronavirus (COVID-19), Pneumococcal (pneumonia), Tdap, Hepatitis B, Zoster (shingles)                 Adherence and  Self-Administration  Adherence related to the patient's specialty therapy was discussed with the patient. The Adherence segment of this outreach has been reviewed and updated.   Is there a concern with patient's ability to self administer the medication correctly and without issue?: No  Were any potential barriers to adherence identified during the initial assessment or patient education?: No  Are there any concerns regarding the patient's understanding of the importance of medication adherence?: No  Methods for Supporting Patient Adherence and/or Self-Administration: None    Goals of Therapy  Goals related to the patient's specialty therapy were discussed with the patient. The Patient Goals segment of this outreach has been reviewed and updated.   Goals Addressed Today        Specialty Pharmacy General Goal      Reduce frequency and intensity of migraine headaches.                Reassessment Plan & Follow-Up  Medication Therapy Changes: Emgality 120 mg subcutaneously monthly  Related Plans, Therapy Recommendations, or Therapy Problems to Be Addressed: Reviewed  Specialty Pharmacy Services.   Pharmacist to perform regular reassessments no more than (6) months from the previous assessment.  Care Coordinator to set up future refill outreaches, coordinate prescription delivery, and escalate clinical questions to pharmacist.   Welcome information and patient satisfaction survey to be sent by specialty pharmacy team with patient's initial fill.    Attestation  Therapeutic appropriateness: Appropriate   I attest the patient was actively involved in and has agreed to the above plan of care. If the prescribed therapy is at any point deemed not appropriate based on the current or future assessments, a consultation will be initiated with the patient's specialty care provider to determine the best course of action. The revised plan of therapy will be documented along with any additional patient education provided. Discussed  aforementioned material with patient via telemedicine.    Diane Martinez, PharmD, PARUL  Clinic Specialty Pharmacist, Neurology  4/8/2025  16:10 EDT

## 2025-04-15 ENCOUNTER — SPECIALTY PHARMACY (OUTPATIENT)
Dept: GENERAL RADIOLOGY | Facility: HOSPITAL | Age: 40
End: 2025-04-15
Payer: OTHER GOVERNMENT

## 2025-05-19 DIAGNOSIS — G43.009 MIGRAINE WITHOUT AURA AND WITHOUT STATUS MIGRAINOSUS, NOT INTRACTABLE: ICD-10-CM

## 2025-05-19 RX ORDER — NORTRIPTYLINE HYDROCHLORIDE 25 MG/1
50 CAPSULE ORAL NIGHTLY
Qty: 60 CAPSULE | Refills: 1 | Status: SHIPPED | OUTPATIENT
Start: 2025-05-19

## 2025-05-27 ENCOUNTER — OFFICE VISIT (OUTPATIENT)
Dept: NEUROLOGY | Facility: CLINIC | Age: 40
End: 2025-05-27
Payer: MEDICAID

## 2025-05-27 VITALS
BODY MASS INDEX: 41.65 KG/M2 | HEIGHT: 65 IN | OXYGEN SATURATION: 99 % | DIASTOLIC BLOOD PRESSURE: 80 MMHG | WEIGHT: 250 LBS | SYSTOLIC BLOOD PRESSURE: 110 MMHG | HEART RATE: 81 BPM

## 2025-05-27 DIAGNOSIS — G43.009 MIGRAINE WITHOUT AURA AND WITHOUT STATUS MIGRAINOSUS, NOT INTRACTABLE: ICD-10-CM

## 2025-05-27 DIAGNOSIS — G44.209 TENSION HEADACHE: ICD-10-CM

## 2025-05-27 RX ORDER — PROPRANOLOL HYDROCHLORIDE 60 MG/1
60 CAPSULE, EXTENDED RELEASE ORAL DAILY
Qty: 30 CAPSULE | Refills: 2 | Status: SHIPPED | OUTPATIENT
Start: 2025-05-27

## 2025-05-27 NOTE — PROGRESS NOTES
"   Neuro Office Visit      Encounter Date: 2025   Patient Name: Maryanne Reid  : 1985   MRN: [id number]   PCP:  Ann Marie Singletary PA-C     Chief Complaint:    Chief Complaint   Patient presents with    Follow-up     Migraine without aura and without status migrainosus, not intractable       History of Present Illness: Maryanne Reid is a 40 y.o. female who is here today in Neurology for migraines    Initial visit 2023:  Maryanne Reid is a 38 y.o. female who is here today in Neurology for  migraines.     PMH of allergic rhinitis, HTN, and headaches.      Patient was seen by PCP on 2023 and at that visit reported migraines for which she takes Maxalt as needed, has also tried Nurtec.  Requested refill of promethazine if she keeps it on hand for migraines.  Reported that she continued to have headaches but they have gotten better since seeing her chiropractor but still having them frequently.       Migraines have been present for 20+ years, they have become more frequent and intense, usually located on forehead, + light/ sound sensitivity, + nausea, notes during migraine her vision is \"fuzzy\", some weeks she can have up to 3 migraine days, triggers include menstrual cycle, stress, BP, sinus congestion, dehydration, barometric pressure changes. She denies visual aura.      FH: Mom and brother with MATHEUS; no family history of migraines   She did recently have an eye appointment - She does have astigmatism but reports that otherwise her visit was normal     Abortive: Rizatriptan - doesn't do much, Sumatriptan caused SE of claustrophobia. Nurtec wasn't previously covered by insurance - has been given samples which were helpful, Promethazine  Preventive: Nothing      Sleep is okay, she is a light sleeper and often wakes up at night  + Snoring   Feels that her balance is chronically poor     Follow up visit 2023:  Maryanne Reid returns to neurology clinic for continued evaluation of migraines. She " "has been taking Nortriptyline 10 mg without SE. She is currently having about 11 migraine days per month. She has not noted any significant improvement with Nortriptyline, she does feel that Nurtec helps if she takes it at the start of a migraine, she sometimes will wait to see if it is going to be a \"true migraine\" and finds that it doesn't work as well when she does this. MRI Brain was performed on 10/30/2023 which per impression- no significant abnormality is identified within the brain, no diffusion restriction is identified to suggest acute infarct, mild mucosal thickening within the bilateral maxillary sinuses, multiple T1/T2 hypointense nodules noted within the posterior scalp soft tissues, possibly sebaceous cysts measuring up to 2 cm.  She does report history of cysts, some of which have been surgically removed. She has been referred to sleep medicine and has upcoming appointment on 12/8/2023.         Follow up visit 2/15/2024:  Maryanne Reid returns to neurology clinic for continued evaluation of migraines. She has been taking Nortriptyline 25 mg nightly without any SE, she feels that migraines are slightly better but overall is still experiencing about 10 headaches per month. She is also taking Nurtec 75 mg PRN with great relief of migraines, no SE with Nurtec. She finds that migraines are more apt to occur after work - she works as a teacher. She did see sleep medicine and underwent home sleep study which was negative/non-diagnostic.     Follow up visit 9/10/2024:  Maryanne Reid returns to neurology clinic for continued evaluation of migraines. Nortriptyline was increased to 50 mg at last clinic visit, in between clinic visits she reported that this increase was helping with sleep but not with migraines. She was started on Propranolol 20 mg BID and reported that this had been helping with migraines. She continues to take Nurtec 75 mg PRN rescue treatment with good results. In clinic today she reports " that since starting propranolol she has been having about 6-7 headache days/month, no severe migraines since January. Headaches tend to be left retroorbital but can be right retroorbital, no nausea/vomiting, some light sensitivity, no visual changes. She is a teacher and notes that stress/noise from her work can trigger headaches. No changes in health since last visit. Overall she is seeing a positive improvement in her migraine control with Nortriptyline, Propranolol, and PRN Nurtec.    Follow up visit 2/26/2025:  Maryanne Reid returns to neurology clinic for continued evaluation of migraines and tension headaches. She is currently taking Nortriptyline 50 mg nightly plus Propranolol LA 60 mg daily for headache prevention. She takes Nurtec 75 mg PRN rescue treatment. Still having 6-7 headaches/month, does see benefit from Nurtec, headaches often at the base of her neck. Some light/sound sensitivity. Peppermint oil is also helpful. She reports that severity has overall significantly improved from prior to taking preventive medication but would still like to get headaches under even better control. She denies visual changes. She continues to work as a teacher. Denies changes in health since last clinic visit. Not currently pregnant and no plans of conceiving.     Current visit 5/27/2025:  Maryanne returns for routine follow up. At last clinic visit we elected to add CGRP inhibitor injectable -  she has been initiated on Emgality monthly injectable. She continues to take Nortriptyline 50 mg nightly, Propranolol LA 60 mg daily, and Nurtec 75 mg PRN. She has noted about 50%  improvement since starting Emgality and is now having 3-4 migraines/month. She did last Emgality injection on May 11. She does continue to see relief with Nurtec. She will also take Excedrin PRN for migraine relief. She denies side effects or injection site irritation. 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     Subjective      Review of Systems   Eyes:  Positive for photophobia. Negative for visual disturbance.   Respiratory: Negative.     Cardiovascular: Negative.    Gastrointestinal:  Negative for nausea and vomiting.   Endocrine: Negative.    Genitourinary: Negative.    Musculoskeletal: Negative.    Skin: Negative.    Allergic/Immunologic: Positive for environmental allergies.   Neurological:  Positive for headaches. Negative for tremors, facial asymmetry, speech difficulty, weakness and numbness.   Psychiatric/Behavioral:          Sleeping well with Nortriptyline nightly          Past Medical History:   Past Medical History:   Diagnosis Date    Cluster headache 2000    Since i was a child    Headache, tension-type 2010    Most of my adult life    Hypertension 2022    Migraine 2010    Most of my adult life       Past Surgical History:   Past Surgical History:   Procedure Laterality Date    CHOLECYSTECTOMY  2013    LEFT OOPHORECTOMY      ORTHOPEDIC SURGERY         Family History:   Family History   Problem Relation Age of Onset    Hypertension Mother     Anxiety disorder Mother     Arthritis Mother     Depression Mother     Hyperlipidemia Mother     Hypertension Father     Hyperlipidemia Father     Neuropathy Father         Occured after back surgery    Arthritis Father     Diabetes Paternal Grandmother     Heart disease Maternal Grandmother     Heart disease Paternal Grandfather        Social History:   Social History     Socioeconomic History    Marital status:    Tobacco Use    Smoking status: Never     Passive exposure: Never    Smokeless tobacco: Never   Vaping Use    Vaping status: Never Used   Substance and Sexual Activity    Alcohol use: Never     Alcohol/week: 1.0 standard drink of alcohol     Types: 1 Standard drinks or equivalent per week    Drug use: Never    Sexual activity: Yes     Partners: Male     Birth control/protection: I.U.D.     Comment: only have one ovary and tube because of  "large cyst.       Medications:     Current Outpatient Medications:     ascorbic acid (VITAMIN C) 1000 MG tablet, Take 1 tablet by mouth Daily., Disp: , Rfl:     Cholecalciferol (Vitamin D-3) 125 MCG (5000 UT) tablet, Take  by mouth., Disp: , Rfl:     Cranberry 500 MG capsule, Take  by mouth., Disp: , Rfl:     galcanezumab-gnlm (Emgality) 120 MG/ML auto-injector pen, Inject 1 mL under the skin into the appropriate area as directed Every 30 (Thirty) Days., Disp: 3 mL, Rfl: 3    Levonorgestrel (Mirena, 52 MG,) 20 MCG/DAY intrauterine device IUD, , Disp: , Rfl:     losartan (COZAAR) 50 MG tablet, Take 1 tablet by mouth Daily., Disp: 90 tablet, Rfl: 1    multivitamin with minerals (ONE-A-DAY WOMENS PO), Take 1 tablet by mouth Daily., Disp: , Rfl:     nortriptyline (PAMELOR) 25 MG capsule, TAKE 2 CAPSULES BY MOUTH EVERY NIGHT, Disp: 60 capsule, Rfl: 1    promethazine (PHENERGAN) 25 MG tablet, Take 1 tablet by mouth Every 6 (Six) Hours As Needed for Nausea or Vomiting., Disp: 15 tablet, Rfl: 0    propranolol LA (INDERAL LA) 60 MG 24 hr capsule, Take 1 capsule by mouth Daily., Disp: 30 capsule, Rfl: 2    Rimegepant Sulfate (NURTEC) 75 MG tablet dispersible tablet, Take 1 tablet by mouth As Needed (Take 1 tablet at the onset of headache, Max of 75 mg/24 hours, Max of 18 tabs/30 days.)., Disp: 16 tablet, Rfl: 5    Allergies:   No Known Allergies      Objective     Objective:    /80   Pulse 81   Ht 165.1 cm (65\")   Wt 113 kg (250 lb)   SpO2 99%   Breastfeeding No   BMI 41.60 kg/m²   Body mass index is 41.6 kg/m².    Physical Exam  Vitals reviewed.   Constitutional:       Appearance: Normal appearance.   HENT:      Head: Normocephalic and atraumatic.      Mouth/Throat:      Mouth: Mucous membranes are moist.      Pharynx: Oropharynx is clear.   Pulmonary:      Effort: Pulmonary effort is normal. No respiratory distress.   Skin:     General: Skin is warm and dry.   Neurological:      Mental Status: She is alert.      "     Neurology Exam:    General apperance: NAD.     Mental status: Alert, awake and oriented to time place and person.    Fund of knowledge:  Normal.     Language and Speech: No aphasia or dysarthria.    Naming , Repetition and Comprehension:  Can name objects, repeat a sentence and follow commands. Speech is clear and fluent with good repetition, comprehension, and naming.    Cranial Nerves:   CN II: Visual fields are full. Intact. Pupils - PERRLA  CN III, IV and VI: Extraocular movements are intact. Normal saccades.   CN V: Facial sensation is intact.   CN VII: Muscles of facial expression reveal no asymmetry. Intact.   CN VIII: Hearing is intact.  CN IX and X: Palate elevates symmetrically. Intact  CN XI: Shoulder shrug is intact.   CN XII: Tongue is midline without evidence of atrophy or fasciculation.     Motor:  Right UE muscle strength 5/5. Normal tone.     Left UE muscle strength 5/5. Normal tone.      Right LE muscle strength 5/5. Normal tone.     Left LE muscle strength 5/5. Normal tone.      Sensory: Normal light touch sensation bilaterally.    DTRs: 2+ bilaterally in upper and lower extremities.    Coordination: Normal finger-to-nose    Gait: Normal.          Results:   Imaging:   No Images in the past 120 days found..     Labs:   Lab Results   Component Value Date    GLUCOSE 99 11/25/2024    BUN 9 11/25/2024    CREATININE 0.70 11/25/2024     11/25/2024    K 4.6 11/25/2024     11/25/2024    CALCIUM 9.3 11/25/2024    PROTEINTOT 7.8 11/25/2024    ALBUMIN 4.5 11/25/2024    ALT 15 11/25/2024    AST 16 11/25/2024    ALKPHOS 76 11/25/2024    BILITOT <0.2 11/25/2024    GLOB 3.3 11/25/2024    AGRATIO 1.4 05/23/2024    BCR 13 11/25/2024    ANIONGAP 11.2 11/14/2023    EGFR 114 11/25/2024         Assessment / Plan      Assessment/Plan:   Diagnoses and all orders for this visit:    1. Migraine without aura and without status migrainosus, not intractable  -     propranolol LA (INDERAL LA) 60 MG 24 hr  capsule; Take 1 capsule by mouth Daily.  Dispense: 30 capsule; Refill: 2    2. Tension headache  -     propranolol LA (INDERAL LA) 60 MG 24 hr capsule; Take 1 capsule by mouth Daily.  Dispense: 30 capsule; Refill: 2      Maryanne returns to neurology clinic for routine follow up. She has been doing well overall with regard to migraines since adding Emgality. She recently completed third injection. She is pleased with her current migraine control, will continue current treatment plan (Nortriptyline 50 mg nightly, Propranolol LA 60 mg daily, Emgality 120 mg monthly injectable and Nurtec 75 mg PRN) unchanged today. /80, HR 81. Reviewed CMP from 11/25/2024 which was within normal limits. Will plan to see back in clinic in 3 months or sooner for any concerns.          Patient Education:       Reviewed medications, potential side effects and signs and symptoms to report. Discussed risk versus benefits of treatment plan with patient and/or family-including medications, labs and radiology that may be ordered. Addressed questions and concerns during visit. Patient and/or family verbalized understanding and agree with plan. Instructed to call the office with any questions.    Follow Up:   Return in about 3 months (around 8/27/2025).    I spent 30  minutes in the care of this patient. I personally spent 50 percent of this time counseling and discussing diagnosis, diagnostic testing, evaluation, treatment options, and management .       During this visit the following were done:  Labs Reviewed [x]    Labs Ordered []    Radiology Reports Reviewed []    Radiology Ordered []    PCP Records Reviewed []    Referring Provider Records Reviewed []    ER Records Reviewed []    Hospital Records Reviewed []    History Obtained From Family []    Radiology Images Reviewed []    Other Reviewed []    Records Requested []      SHANTANU Cohen  [facility]  [address]  [facility] [id number] [phone]     Patient

## 2025-06-02 RX ORDER — LOSARTAN POTASSIUM 50 MG/1
50 TABLET ORAL DAILY
Qty: 30 TABLET | Refills: 0 | Status: SHIPPED | OUTPATIENT
Start: 2025-06-02 | End: 2025-06-04 | Stop reason: SDUPTHER

## 2025-06-03 ENCOUNTER — SPECIALTY PHARMACY (OUTPATIENT)
Dept: GENERAL RADIOLOGY | Facility: HOSPITAL | Age: 40
End: 2025-06-03
Payer: MEDICAID

## 2025-06-03 DIAGNOSIS — G43.009 MIGRAINE WITHOUT AURA AND WITHOUT STATUS MIGRAINOSUS, NOT INTRACTABLE: ICD-10-CM

## 2025-06-03 DIAGNOSIS — G44.209 TENSION HEADACHE: ICD-10-CM

## 2025-06-03 RX ORDER — PROPRANOLOL HYDROCHLORIDE 60 MG/1
60 CAPSULE, EXTENDED RELEASE ORAL DAILY
Qty: 30 CAPSULE | Refills: 2 | OUTPATIENT
Start: 2025-06-03

## 2025-06-03 RX ORDER — RIMEGEPANT SULFATE 75 MG/75MG
TABLET, ORALLY DISINTEGRATING ORAL
Qty: 16 TABLET | Refills: 5 | OUTPATIENT
Start: 2025-06-03

## 2025-06-03 NOTE — PROGRESS NOTES
Specialty Pharmacy Patient Management Program  Neurology Medication Addition Assessment     Maryanne Reid is a 40 y.o. female with migraines seen by a Neurology provider and enrolled in the Neurology Patient Management program offered by [facility].  This assessment was conducted as a result of a specialty medication addition or substitution. The patient was previously introduced to services offered by [facility], including: regular assessments, refill coordination, curbside pick-up or mail order delivery options, prior authorization maintenance, and financial assistance programs as applicable. The patient was also provided with contact information for the pharmacy team.     An initial outreach was conducted, including assessment of therapy appropriateness and specialty medication education for Nurtec PRN.    A follow-up outreach was conducted, including assessment of continued therapy appropriateness, medication adherence, and side effect incidence and management for Emgality.    Insurance Coverage & Financial Support  [employer]  Emgality and Nurtec Copay Cards    Relevant Past Medical History and Comorbidities  Relevant medical history and concomitant health conditions were discussed with the patient. The patient's chart has been reviewed for relevant past medical history and comorbid health conditions and updated as necessary.   Past Medical History:   Diagnosis Date    Cluster headache 2000    Since i was a child    Headache, tension-type 2010    Most of my adult life    Hypertension 2022    Migraine 2010    Most of my adult life     Social History     Socioeconomic History    Marital status:    Tobacco Use    Smoking status: Never     Passive exposure: Never    Smokeless tobacco: Never   Vaping Use    Vaping status: Never Used   Substance and Sexual Activity    Alcohol use: Never     Alcohol/week: 1.0 standard drink of alcohol     Types: 1 Standard  drinks or equivalent per week    Drug use: Never    Sexual activity: Yes     Partners: Male     Birth control/protection: I.U.D.     Comment: only have one ovary and tube because of large cyst.     Problem list reviewed by Diane Martinez RPH on 6/3/2025 at 10:03 AM    Hospitalizations and Urgent Care Since Last Assessment  ED Visits, Admissions, or Hospitalizations: None   Urgent Office Visits: None     Allergies  Known allergies and reactions were discussed with the patient. The patient's chart has been reviewed for  allergy information and updated as necessary.   No Known Allergies  Allergies reviewed by Diane Martinez RPH on 6/3/2025 at 10:03 AM    Relevant Laboratory Values  Common labs          11/25/2024    13:41   Common Labs   Glucose 99    BUN 9    Creatinine 0.70    Sodium 140    Potassium 4.6    Chloride 103    Calcium 9.3    Albumin 4.5    Total Bilirubin <0.2    Alkaline Phosphatase 76    AST (SGOT) 16    ALT (SGPT) 15    Hemoglobin A1C 5.9        Current Medication List  This medication list has been reviewed with the patient and evaluated for any interactions or necessary modifications/recommendations, and updated to include all prescription medications, OTC medications, and supplements the patient is currently taking.  This list reflects what is contained in the patient's profile, which has also been marked as reviewed to communicate to other providers it is the most up to date version of the patient's current medication therapy.     Current Outpatient Medications:     rimegepant sulfate ODT (NURTEC-ODT) 75 MG disintegrating tablet, Place 1 tablet under the tongue As Needed (Take 1 tablet at the onset of headache, Max of 75 mg/24 hours, Max of 18 tabs/30 days.)., Disp: 8 tablet, Rfl: 11    ascorbic acid (VITAMIN C) 1000 MG tablet, Take 1 tablet by mouth Daily., Disp: , Rfl:     Cholecalciferol (Vitamin D-3) 125 MCG (5000 UT) tablet, Take  by mouth., Disp: , Rfl:     Cranberry 500 MG  capsule, Take  by mouth., Disp: , Rfl:     galcanezumab-gnlm (Emgality) 120 MG/ML auto-injector pen, Inject 1 mL under the skin into the appropriate area as directed Every 30 (Thirty) Days., Disp: 3 mL, Rfl: 3    Levonorgestrel (Mirena, 52 MG,) 20 MCG/DAY intrauterine device IUD, , Disp: , Rfl:     losartan (COZAAR) 50 MG tablet, TAKE 1 TABLET BY MOUTH DAILY, Disp: 30 tablet, Rfl: 0    multivitamin with minerals (ONE-A-DAY WOMENS PO), Take 1 tablet by mouth Daily., Disp: , Rfl:     nortriptyline (PAMELOR) 25 MG capsule, TAKE 2 CAPSULES BY MOUTH EVERY NIGHT, Disp: 60 capsule, Rfl: 1    promethazine (PHENERGAN) 25 MG tablet, Take 1 tablet by mouth Every 6 (Six) Hours As Needed for Nausea or Vomiting., Disp: 15 tablet, Rfl: 0    propranolol LA (INDERAL LA) 60 MG 24 hr capsule, Take 1 capsule by mouth Daily., Disp: 30 capsule, Rfl: 2    Medicines reviewed by Diane Martinez [facility] on 6/3/2025 at 10:03 AM    Drug Interactions  None     Initial Education Provided for Specialty Medication  The patient has been provided with the following education and any applicable administration techniques (i.e. self-injection) have been demonstrated for the therapies indicated. All questions and concerns have been addressed prior to the patient receiving the medication, and the patient has verbalized comprehension of the education and any materials provided.  Additional patient education shall be provided and documented upon request by the patient, provider or payer.      Nurtec (rimegepant)  Medication Expectations   Why am I taking this medication? You are taking this medication for migraine prophylaxis or to treat an acute migraine.   What should I expect while on this medication? You should expect to see a decrease in the frequency and severity of your migraines.   How does the medication work? Nurtec is a monoclonal antibody that binds to calcitonin gene-related peptide (CGRP) and blocks its binding to the receptor decreasing the  severity of migraines.   How long will I be on this medication for? The amount of time you will be on this medication will be determined by your doctor and your response to the medication.    How do I take this medication? Take as directed on your prescription label.   What are some possible side effects? Potential side effects including, but not limited to nausea. Pt verbalized understanding.   What happens if I miss a dose? Take the missed dose as soon as possible, and resume the every other day timed from the last dose..     Medication Safety   What are things I should warn my doctor immediately about? Hypersensitivity reactions - trouble breathing or swallowing.   What are things that I should be cautious of? Hypersensitivity reactions (eg, dyspnea, rash), including delayed serious reactions, have occurred; discontinue use if suspected    What are some medications that can interact with this one? Avoid concomitant administration of Nurtec ODT with strong inhibitors of CY, strong or moderate inducers of CYP3A or inhibitors of P-gp or BCRP. Avoid another dose of Nurtec ODT within 48 hours when it is administered with moderate inhibitors of CY.  Ask your pharmacist or health care provider before starting new medications     Medication Storage/Handling   How should I handle this medication? Keep this medication out of reach of pets/children in original container. Ensure hands are dry before opening blister pack.   How does this medication need to be stored? Store at room temperature away from heat/cold, sunlight or moisture   How should I dispose of this medication? There should not be a need to dispose of this medication unless your provider decides to change the dose or therapy. If that is the case, take to your local police station for proper disposal. Some pharmacies also have take-back bins for medication drop-off.      Resources/Support   How can I remind myself to take this medication? You can  download reminder apps to help you manage your refills. You may also set an alarm on your phone to remind you. The pharmacy carries pill boxes that you can place next to an area you pass everyday (such as where you place your car keys or where you charge your phone)   Is financial support available?  Yes, [facility] can provide co-pay cards if you have commercial insurance or patient assistance if you have Medicare or no insurance.    Which vaccines are recommended for me? Talk to your doctor about these vaccines: Flu, Coronavirus (COVID-19), Pneumococcal (pneumonia), Tdap, Hepatitis B, Zoster (shingles)          Adherence, Self-Administration, and Current Therapy Problems  Adherence related to the patient's specialty therapy was discussed with the patient. The Adherence segment of this outreach has been reviewed and updated.     Is there a concern with patient's ability to self administer the medication correctly and without issue?: No  Were any potential barriers to adherence identified during the initial assessment or patient education?: No  Are there any concerns regarding the patient's understanding of the importance of medication adherence?: No    Adherence Questions  Linked Medication(s) Assessed: Galcanezumab-gnlm (Emgality)  On average, how many doses/injections does the patient miss per month?: 0  What are the identified reasons for non-adherence or missed doses? : no problems identified  What is the estimated medication adherence level?: % (Emgality - 95%)  Based on the patient/caregiver response and refill history, does this patient require an MTP to track adherence improvements?: no    Additional Barriers to Patient Self-Administration: None  Methods for Supporting Patient Self-Administration: Not required    Recently Close Medication Therapy Problems  No medication therapy recommendations to display  Open Medication Therapy Problems  No medication therapy recommendations to display      Adverse Drug Reactions  Medication tolerability: Tolerating with no to minimal ADRs          Medication plan: Continue therapy with normal follow-up  Plan for ADR Management: None    Goals of Therapy  Goals related to the patient's specialty therapy were discussed with the patient. The Patient Goals segment of this outreach has been reviewed and updated.   Goals Addressed Today        Specialty Pharmacy General Goal      On Average, Reduce:   Frequency of migraines to 5 per month.   Symptom severity by 50% within 1 hours of taking acute therapy.   Duration of migraines to 2 hours.     Baseline Values/Notes on Enrollment  Frequency: 10 MMD  Symptom Severity: 8/10 intensity  Duration: Several hours    Date of Reassessment Notes on Progress Toward Above Goals   6/3/25 Patient reports average 3-4 MMD that are 3-4/10 intensity and Nurtec works within an hour or two to resolve.                                                        Quality of Life Assessment   Quality of Life related to the patient's enrollment in the patient management program and services provided was discussed with the patient. The QOL segment of this outreach has been reviewed and updated.   Quality of Life Improvement Scale: 9-A good deal better    Reassessment Plan & Follow-Up  Medication Therapy Changes: Continue Emgality 120 mg subcutaneously monthly and Nurtec 75 mg PO once daily as needed for migraines. - Take 1 tablet at the onset of headache, Max of 75 mg/24 hours.  Related Plans, Therapy Recommendations, or Issues to Be Addressed: Reviewed  Specialty Pharmacy Services.    Pharmacist to perform regular reassessments no more than (6) months from the previous assessment.  Care Coordinator to set up future refill outreaches, coordinate prescription delivery, and escalate clinical questions to pharmacist.     Attestation  Therapeutic appropriateness: Appropriate  I attest the patient was actively involved in and has agreed to the above plan  of care. If the prescribed therapy is at any point deemed not appropriate based on the current or future assessments, a consultation will be initiated with the patient's specialty care provider to determine the best course of action. The revised plan of therapy will be documented along with any additional patient education provided. Discussed aforementioned material with patient via telemedicine.    Sadi HairstonD, PARUL  Clinic Specialty Pharmacist, Neurology  6/3/2025  10:04 EDT

## 2025-06-03 NOTE — TELEPHONE ENCOUNTER
Rx Refill Note  Requested Prescriptions     Pending Prescriptions Disp Refills    propranolol LA (INDERAL LA) 60 MG 24 hr capsule [Pharmacy Med Name: PROPRANOLOL ER 60MG CAPSULES] 30 capsule 2     Sig: TAKE 1 CAPSULE BY MOUTH DAILY      Last filled: 5/27/2025 30 days, 2 refills   Last office visit with prescribing clinician: 5/27/2025      Next office visit with prescribing clinician: 9/24/2025     Marci Everett RN  06/03/25, 11:59 EDT    Too soon for prescription, just had been sent.

## 2025-06-04 ENCOUNTER — OFFICE VISIT (OUTPATIENT)
Dept: FAMILY MEDICINE CLINIC | Facility: CLINIC | Age: 40
End: 2025-06-04
Payer: COMMERCIAL

## 2025-06-04 VITALS
HEIGHT: 65 IN | DIASTOLIC BLOOD PRESSURE: 68 MMHG | WEIGHT: 257 LBS | SYSTOLIC BLOOD PRESSURE: 108 MMHG | OXYGEN SATURATION: 96 % | BODY MASS INDEX: 42.82 KG/M2 | TEMPERATURE: 97.9 F | HEART RATE: 102 BPM

## 2025-06-04 DIAGNOSIS — E55.9 VITAMIN D DEFICIENCY: ICD-10-CM

## 2025-06-04 DIAGNOSIS — Z13.220 LIPID SCREENING: ICD-10-CM

## 2025-06-04 DIAGNOSIS — I10 ESSENTIAL HYPERTENSION: ICD-10-CM

## 2025-06-04 DIAGNOSIS — R73.9 HYPERGLYCEMIA: ICD-10-CM

## 2025-06-04 DIAGNOSIS — Z00.00 ROUTINE PHYSICAL EXAMINATION: Primary | ICD-10-CM

## 2025-06-04 PROCEDURE — 99395 PREV VISIT EST AGE 18-39: CPT | Performed by: PHYSICIAN ASSISTANT

## 2025-06-04 RX ORDER — LOSARTAN POTASSIUM 50 MG/1
50 TABLET ORAL DAILY
Qty: 90 TABLET | Refills: 1 | Status: SHIPPED | OUTPATIENT
Start: 2025-06-04

## 2025-06-04 NOTE — PROGRESS NOTES
"Chief Complaint  Annual Exam    Subjective          Maryanne Reid presents to [facility]  History of Present Illness  Patient in today for routine physical exam and would like to return to clinic for fasting labs in the morning.  She states her blood pressure has been doing well.  Denies any chest pain or shortness of breath.  She states she is currently up-to-date on her eye and dental exam.  She is also up-to-date on her GYN exam.      Objective   Vital Signs:   /68   Pulse 102   Temp 97.9 °F (36.6 °C) (Oral)   Ht 165.1 cm (65\")   Wt 117 kg (257 lb)   SpO2 96%   BMI 42.77 kg/m²     Body mass index is 42.77 kg/m².    Review of Systems   Constitutional:  Negative for fatigue and fever.   HENT:  Negative for congestion and sore throat.    Respiratory:  Negative for cough and shortness of breath.    Cardiovascular:  Negative for chest pain.   Gastrointestinal:  Negative for abdominal pain, diarrhea, nausea and vomiting.   Genitourinary:  Negative for dysuria and frequency.   Neurological:  Negative for dizziness and headache.   Psychiatric/Behavioral:  Negative for depressed mood. The patient is not nervous/anxious.        Past History:  Medical History: has a past medical history of Cluster headache (2000), Headache, tension-type (2010), Hypertension (2022), and Migraine (2010).   Surgical History: has a past surgical history that includes orthopedic surgery; Left oophorectomy; and Cholecystectomy (2013).   Family History: family history includes Anxiety disorder in her mother; Arthritis in her father and mother; Depression in her mother; Diabetes in her paternal grandmother; Heart disease in her maternal grandmother and paternal grandfather; Hyperlipidemia in her father and mother; Hypertension in her father and mother; Neuropathy in her father.   Social History: reports that she has never smoked. She has never been exposed to tobacco smoke. She has never used smokeless " tobacco. She reports that she does not drink alcohol and does not use drugs.      Current Outpatient Medications:     ascorbic acid (VITAMIN C) 1000 MG tablet, Take 1 tablet by mouth Daily., Disp: , Rfl:     Cholecalciferol (Vitamin D-3) 125 MCG (5000 UT) tablet, Take  by mouth., Disp: , Rfl:     Cranberry 500 MG capsule, Take  by mouth., Disp: , Rfl:     galcanezumab-gnlm (Emgality) 120 MG/ML auto-injector pen, Inject 1 mL under the skin into the appropriate area as directed Every 30 (Thirty) Days., Disp: 3 mL, Rfl: 3    Levonorgestrel (Mirena, 52 MG,) 20 MCG/DAY intrauterine device IUD, , Disp: , Rfl:     losartan (COZAAR) 50 MG tablet, Take 1 tablet by mouth Daily., Disp: 90 tablet, Rfl: 1    multivitamin with minerals (ONE-A-DAY WOMENS PO), Take 1 tablet by mouth Daily., Disp: , Rfl:     nortriptyline (PAMELOR) 25 MG capsule, TAKE 2 CAPSULES BY MOUTH EVERY NIGHT, Disp: 60 capsule, Rfl: 1    promethazine (PHENERGAN) 25 MG tablet, Take 1 tablet by mouth Every 6 (Six) Hours As Needed for Nausea or Vomiting., Disp: 15 tablet, Rfl: 0    propranolol LA (INDERAL LA) 60 MG 24 hr capsule, Take 1 capsule by mouth Daily., Disp: 30 capsule, Rfl: 2    rimegepant sulfate ODT (NURTEC-ODT) 75 MG disintegrating tablet, Place 1 tablet under the tongue As Needed (Take 1 tablet at the onset of headache, Max of 75 mg/24 hours, Max of 18 tabs/30 days.)., Disp: 8 tablet, Rfl: 11  Allergies: Patient has no known allergies.    Physical Exam  Constitutional:       Appearance: Normal appearance.   HENT:      Right Ear: Tympanic membrane normal.      Left Ear: Tympanic membrane normal.      Mouth/Throat:      Pharynx: Oropharynx is clear.   Eyes:      Conjunctiva/sclera: Conjunctivae normal.      Pupils: Pupils are equal, round, and reactive to light.   Cardiovascular:      Rate and Rhythm: Normal rate and regular rhythm.      Heart sounds: Normal heart sounds.   Pulmonary:      Effort: Pulmonary effort is normal.      Breath sounds:  Normal breath sounds.   Abdominal:      Palpations: Abdomen is soft.      Tenderness: There is no abdominal tenderness.   Neurological:      Mental Status: She is oriented to person, place, and time.   Psychiatric:         Mood and Affect: Mood normal.         Behavior: Behavior normal.             Assessment and Plan   Routine physical examination  Essential hypertension  Continue current medication and monitoring of blood pressure   Lipid screening  Patient to rtc for further evaluation   Hyperglycemia  Will check hga1c- encourage good healthy diet and exercise   Vitamin D deficiency  Will check vit d level with labs      Follow Up   No follow-ups on file.  Patient was given instructions and counseling regarding her condition or for health maintenance advice. Please see specific information pulled into the AVS if appropriate.     Ann Marie Singletary PA-C

## 2025-06-11 ENCOUNTER — LAB (OUTPATIENT)
Dept: FAMILY MEDICINE CLINIC | Facility: CLINIC | Age: 40
End: 2025-06-11
Payer: COMMERCIAL

## 2025-06-12 LAB
25(OH)D3+25(OH)D2 SERPL-MCNC: 29.2 NG/ML (ref 30–100)
ALBUMIN SERPL-MCNC: 4.5 G/DL (ref 3.9–4.9)
ALP SERPL-CCNC: 81 IU/L (ref 44–121)
ALT SERPL-CCNC: 23 IU/L (ref 0–32)
AST SERPL-CCNC: 19 IU/L (ref 0–40)
BASOPHILS # BLD AUTO: 0.1 X10E3/UL (ref 0–0.2)
BASOPHILS NFR BLD AUTO: 1 %
BILIRUB SERPL-MCNC: 0.3 MG/DL (ref 0–1.2)
BUN SERPL-MCNC: 12 MG/DL (ref 6–20)
BUN/CREAT SERPL: 15 (ref 9–23)
CALCIUM SERPL-MCNC: 9.5 MG/DL (ref 8.7–10.2)
CHLORIDE SERPL-SCNC: 96 MMOL/L (ref 96–106)
CHOLEST SERPL-MCNC: 201 MG/DL (ref 100–199)
CO2 SERPL-SCNC: 21 MMOL/L (ref 20–29)
CREAT SERPL-MCNC: 0.81 MG/DL (ref 0.57–1)
EGFRCR SERPLBLD CKD-EPI 2021: 95 ML/MIN/1.73
EOSINOPHIL # BLD AUTO: 0 X10E3/UL (ref 0–0.4)
EOSINOPHIL NFR BLD AUTO: 0 %
ERYTHROCYTE [DISTWIDTH] IN BLOOD BY AUTOMATED COUNT: 13.4 % (ref 11.7–15.4)
GLOBULIN SER CALC-MCNC: 3.5 G/DL (ref 1.5–4.5)
GLUCOSE SERPL-MCNC: 89 MG/DL (ref 70–99)
HBA1C MFR BLD: 5.7 % (ref 4.8–5.6)
HCT VFR BLD AUTO: 43 % (ref 34–46.6)
HDLC SERPL-MCNC: 43 MG/DL
HGB BLD-MCNC: 13.8 G/DL (ref 11.1–15.9)
IMM GRANULOCYTES # BLD AUTO: 0 X10E3/UL (ref 0–0.1)
IMM GRANULOCYTES NFR BLD AUTO: 0 %
LDLC SERPL CALC-MCNC: 105 MG/DL (ref 0–99)
LYMPHOCYTES # BLD AUTO: 2.1 X10E3/UL (ref 0.7–3.1)
LYMPHOCYTES NFR BLD AUTO: 26 %
MCH RBC QN AUTO: 29.1 PG (ref 26.6–33)
MCHC RBC AUTO-ENTMCNC: 32.1 G/DL (ref 31.5–35.7)
MCV RBC AUTO: 91 FL (ref 79–97)
MONOCYTES # BLD AUTO: 0.5 X10E3/UL (ref 0.1–0.9)
MONOCYTES NFR BLD AUTO: 6 %
NEUTROPHILS # BLD AUTO: 5.5 X10E3/UL (ref 1.4–7)
NEUTROPHILS NFR BLD AUTO: 67 %
PLATELET # BLD AUTO: 388 X10E3/UL (ref 150–450)
POTASSIUM SERPL-SCNC: 4.5 MMOL/L (ref 3.5–5.2)
PROT SERPL-MCNC: 8 G/DL (ref 6–8.5)
RBC # BLD AUTO: 4.74 X10E6/UL (ref 3.77–5.28)
SODIUM SERPL-SCNC: 137 MMOL/L (ref 134–144)
TRIGL SERPL-MCNC: 313 MG/DL (ref 0–149)
TSH SERPL DL<=0.005 MIU/L-ACNC: 1.46 UIU/ML (ref 0.45–4.5)
VLDLC SERPL CALC-MCNC: 53 MG/DL (ref 5–40)
WBC # BLD AUTO: 8.1 X10E3/UL (ref 3.4–10.8)

## 2025-06-17 ENCOUNTER — RESULTS FOLLOW-UP (OUTPATIENT)
Dept: FAMILY MEDICINE CLINIC | Facility: CLINIC | Age: 40
End: 2025-06-17
Payer: MEDICAID

## 2025-06-17 DIAGNOSIS — E78.2 MIXED HYPERLIPIDEMIA: Primary | ICD-10-CM

## 2025-06-18 NOTE — TELEPHONE ENCOUNTER
LM on VM to call back.    HUB to relay message from Isa    Please let know labs showed LDL cholesterol at 105, nml <100- triglycerides went up to 313, nml <150- -please work on healthy diet and exercise to help keep low and recommend to recheck fasting labs in 3-6 months; 3 month sugar in prediabetic range but has improved down to 5.7. Vit D at 29.2- recommend daily vit d supplement. Blood count and thyroid tests were in nml range; thanks

## 2025-07-01 ENCOUNTER — SPECIALTY PHARMACY (OUTPATIENT)
Dept: NEUROLOGY | Facility: CLINIC | Age: 40
End: 2025-07-01
Payer: MEDICAID

## 2025-07-01 NOTE — PROGRESS NOTES
Specialty Pharmacy Patient Management Program  Refill Outreach     Maryanne was contacted today regarding refills of their medication(s).    Refill Questions      Flowsheet Row Most Recent Value   Changes to allergies? No   Changes to medications? No   New conditions or infections since last clinic visit No   Unplanned office visit, urgent care, ED, or hospital admission in the last 4 weeks  No   How does patient/caregiver feel medication is working? Good   Financial problems or insurance changes  No   Since the previous refill, were any specialty medication doses or scheduled injections missed or delayed?  No   Does this patient require a clinical escalation to a pharmacist? No            Delivery Questions      Flowsheet Row Most Recent Value   Delivery method UPS   Delivery address verified with patient/caregiver? Yes   Delivery address Home   Number of medications in delivery 1   Medication(s) being filled and delivered Galcanezumab-gnlm (Emgality)   Doses left of specialty medications 3   Copay verified? Yes   Copay amount $35   Copay form of payment Credit/debit on file   Delivery Date Selection 07/02/25   Signature Required No                 Follow-up: 28 day(s)     Carlito Lee, Pharmacy Technician  7/1/2025  13:47 EDT

## 2025-07-20 DIAGNOSIS — G43.009 MIGRAINE WITHOUT AURA AND WITHOUT STATUS MIGRAINOSUS, NOT INTRACTABLE: ICD-10-CM

## 2025-07-21 RX ORDER — NORTRIPTYLINE HYDROCHLORIDE 25 MG/1
50 CAPSULE ORAL NIGHTLY
Qty: 60 CAPSULE | Refills: 2 | Status: SHIPPED | OUTPATIENT
Start: 2025-07-21

## 2025-07-21 NOTE — TELEPHONE ENCOUNTER
Rx Refill Note  Requested Prescriptions     Pending Prescriptions Disp Refills    nortriptyline (PAMELOR) 25 MG capsule 60 capsule 1     Sig: Take 2 capsules by mouth Every Night.      Last filled:5/19/25 1 ref  Last office visit with prescribing clinician: 5/27/2025      Next office visit with prescribing clinician: 9/24/2025     Ananya Jaimes MA  07/21/25, 08:58 EDT      Sent to pharmacy

## 2025-07-23 DIAGNOSIS — G43.009 MIGRAINE WITHOUT AURA AND WITHOUT STATUS MIGRAINOSUS, NOT INTRACTABLE: ICD-10-CM

## 2025-07-23 RX ORDER — NORTRIPTYLINE HYDROCHLORIDE 25 MG/1
50 CAPSULE ORAL NIGHTLY
Qty: 60 CAPSULE | Refills: 2 | OUTPATIENT
Start: 2025-07-23

## 2025-07-23 NOTE — TELEPHONE ENCOUNTER
Rx Refill Note  Requested Prescriptions     Pending Prescriptions Disp Refills    nortriptyline (PAMELOR) 25 MG capsule [Pharmacy Med Name: NORTRIPTYLINE 25MG CAPSULES] 60 capsule 2     Sig: TAKE 2 CAPSULES BY MOUTH EVERY NIGHT      Last filled:7/21/25 2 ref  Last office visit with prescribing clinician: 5/27/2025      Next office visit with prescribing clinician: 9/24/2025     Ananya Jaimes MA  07/23/25, 11:33 EDT    Sent to [facility] pharmacy 7/21/25.  Duplicate-denied

## 2025-08-22 ENCOUNTER — SPECIALTY PHARMACY (OUTPATIENT)
Dept: NEUROLOGY | Facility: CLINIC | Age: 40
End: 2025-08-22
Payer: MEDICAID